# Patient Record
Sex: FEMALE | Race: BLACK OR AFRICAN AMERICAN | NOT HISPANIC OR LATINO
[De-identification: names, ages, dates, MRNs, and addresses within clinical notes are randomized per-mention and may not be internally consistent; named-entity substitution may affect disease eponyms.]

---

## 2017-09-21 ENCOUNTER — APPOINTMENT (OUTPATIENT)
Dept: INTERNAL MEDICINE | Facility: HOSPITAL | Age: 37
End: 2017-09-21

## 2018-01-02 ENCOUNTER — APPOINTMENT (OUTPATIENT)
Dept: INTERNAL MEDICINE | Facility: HOSPITAL | Age: 38
End: 2018-01-02

## 2018-02-05 ENCOUNTER — OTHER (OUTPATIENT)
Age: 38
End: 2018-02-05

## 2018-02-06 ENCOUNTER — APPOINTMENT (OUTPATIENT)
Dept: INTERNAL MEDICINE | Facility: HOSPITAL | Age: 38
End: 2018-02-06
Payer: MEDICAID

## 2018-02-06 ENCOUNTER — OUTPATIENT (OUTPATIENT)
Dept: OUTPATIENT SERVICES | Facility: HOSPITAL | Age: 38
LOS: 1 days | End: 2018-02-06

## 2018-02-06 ENCOUNTER — LABORATORY RESULT (OUTPATIENT)
Age: 38
End: 2018-02-06

## 2018-02-06 ENCOUNTER — MED ADMIN CHARGE (OUTPATIENT)
Age: 38
End: 2018-02-06

## 2018-02-06 ENCOUNTER — OTHER (OUTPATIENT)
Age: 38
End: 2018-02-06

## 2018-02-06 VITALS — DIASTOLIC BLOOD PRESSURE: 90 MMHG | SYSTOLIC BLOOD PRESSURE: 162 MMHG | HEART RATE: 84 BPM

## 2018-02-06 VITALS — WEIGHT: 220 LBS | HEIGHT: 63 IN | BODY MASS INDEX: 38.98 KG/M2

## 2018-02-06 LAB
BASOPHILS # BLD AUTO: 0.08 K/UL — SIGNIFICANT CHANGE UP (ref 0–0.2)
BASOPHILS NFR BLD AUTO: 1 % — SIGNIFICANT CHANGE UP (ref 0–2)
BUN SERPL-MCNC: 10 MG/DL — SIGNIFICANT CHANGE UP (ref 7–23)
CALCIUM SERPL-MCNC: 9.8 MG/DL — SIGNIFICANT CHANGE UP (ref 8.4–10.5)
CHLORIDE SERPL-SCNC: 99 MMOL/L — SIGNIFICANT CHANGE UP (ref 98–107)
CO2 SERPL-SCNC: 28 MMOL/L — SIGNIFICANT CHANGE UP (ref 22–31)
CREAT SERPL-MCNC: 0.62 MG/DL — SIGNIFICANT CHANGE UP (ref 0.5–1.3)
EOSINOPHIL # BLD AUTO: 0.36 K/UL — SIGNIFICANT CHANGE UP (ref 0–0.5)
EOSINOPHIL NFR BLD AUTO: 4.3 % — SIGNIFICANT CHANGE UP (ref 0–6)
GLUCOSE SERPL-MCNC: 95 MG/DL — SIGNIFICANT CHANGE UP (ref 70–99)
HBA1C BLD-MCNC: 5.8 % — HIGH (ref 4–5.6)
HCT VFR BLD CALC: 40 % — SIGNIFICANT CHANGE UP (ref 34.5–45)
HGB BLD-MCNC: 13.3 G/DL — SIGNIFICANT CHANGE UP (ref 11.5–15.5)
IMM GRANULOCYTES # BLD AUTO: 0.06 # — SIGNIFICANT CHANGE UP
IMM GRANULOCYTES NFR BLD AUTO: 0.7 % — SIGNIFICANT CHANGE UP (ref 0–1.5)
LYMPHOCYTES # BLD AUTO: 2.42 K/UL — SIGNIFICANT CHANGE UP (ref 1–3.3)
LYMPHOCYTES # BLD AUTO: 29 % — SIGNIFICANT CHANGE UP (ref 13–44)
MCHC RBC-ENTMCNC: 27 PG — SIGNIFICANT CHANGE UP (ref 27–34)
MCHC RBC-ENTMCNC: 33.3 % — SIGNIFICANT CHANGE UP (ref 32–36)
MCV RBC AUTO: 81.1 FL — SIGNIFICANT CHANGE UP (ref 80–100)
MONOCYTES # BLD AUTO: 0.64 K/UL — SIGNIFICANT CHANGE UP (ref 0–0.9)
MONOCYTES NFR BLD AUTO: 7.7 % — SIGNIFICANT CHANGE UP (ref 2–14)
NEUTROPHILS # BLD AUTO: 4.79 K/UL — SIGNIFICANT CHANGE UP (ref 1.8–7.4)
NEUTROPHILS NFR BLD AUTO: 57.3 % — SIGNIFICANT CHANGE UP (ref 43–77)
NRBC # FLD: 0 — SIGNIFICANT CHANGE UP
PLATELET # BLD AUTO: 306 K/UL — SIGNIFICANT CHANGE UP (ref 150–400)
PMV BLD: 10.6 FL — SIGNIFICANT CHANGE UP (ref 7–13)
POTASSIUM SERPL-MCNC: 4.2 MMOL/L — SIGNIFICANT CHANGE UP (ref 3.5–5.3)
POTASSIUM SERPL-SCNC: 4.2 MMOL/L — SIGNIFICANT CHANGE UP (ref 3.5–5.3)
RBC # BLD: 4.93 M/UL — SIGNIFICANT CHANGE UP (ref 3.8–5.2)
RBC # FLD: 13.8 % — SIGNIFICANT CHANGE UP (ref 10.3–14.5)
SODIUM SERPL-SCNC: 139 MMOL/L — SIGNIFICANT CHANGE UP (ref 135–145)
WBC # BLD: 8.35 K/UL — SIGNIFICANT CHANGE UP (ref 3.8–10.5)
WBC # FLD AUTO: 8.35 K/UL — SIGNIFICANT CHANGE UP (ref 3.8–10.5)

## 2018-02-06 PROCEDURE — 99214 OFFICE O/P EST MOD 30 MIN: CPT | Mod: GC

## 2018-02-07 DIAGNOSIS — Z00.00 ENCOUNTER FOR GENERAL ADULT MEDICAL EXAMINATION WITHOUT ABNORMAL FINDINGS: ICD-10-CM

## 2018-02-07 DIAGNOSIS — E28.2 POLYCYSTIC OVARIAN SYNDROME: ICD-10-CM

## 2018-02-07 DIAGNOSIS — N64.4 MASTODYNIA: ICD-10-CM

## 2018-02-07 DIAGNOSIS — R73.09 OTHER ABNORMAL GLUCOSE: ICD-10-CM

## 2018-02-07 DIAGNOSIS — I10 ESSENTIAL (PRIMARY) HYPERTENSION: ICD-10-CM

## 2018-03-12 ENCOUNTER — OUTPATIENT (OUTPATIENT)
Dept: OUTPATIENT SERVICES | Facility: HOSPITAL | Age: 38
LOS: 1 days | End: 2018-03-12

## 2018-03-12 ENCOUNTER — APPOINTMENT (OUTPATIENT)
Dept: INTERNAL MEDICINE | Facility: HOSPITAL | Age: 38
End: 2018-03-12
Payer: MEDICAID

## 2018-03-12 VITALS — SYSTOLIC BLOOD PRESSURE: 150 MMHG | DIASTOLIC BLOOD PRESSURE: 98 MMHG | HEART RATE: 87 BPM

## 2018-03-12 VITALS — BODY MASS INDEX: 38.98 KG/M2 | HEIGHT: 63 IN | WEIGHT: 220 LBS

## 2018-03-12 PROCEDURE — 99213 OFFICE O/P EST LOW 20 MIN: CPT | Mod: GE

## 2018-03-15 DIAGNOSIS — R73.09 OTHER ABNORMAL GLUCOSE: ICD-10-CM

## 2018-03-15 DIAGNOSIS — E28.2 POLYCYSTIC OVARIAN SYNDROME: ICD-10-CM

## 2018-03-15 DIAGNOSIS — N64.4 MASTODYNIA: ICD-10-CM

## 2018-03-15 DIAGNOSIS — I10 ESSENTIAL (PRIMARY) HYPERTENSION: ICD-10-CM

## 2018-03-28 ENCOUNTER — RESULT REVIEW (OUTPATIENT)
Age: 38
End: 2018-03-28

## 2018-03-28 ENCOUNTER — OUTPATIENT (OUTPATIENT)
Dept: OUTPATIENT SERVICES | Facility: HOSPITAL | Age: 38
LOS: 1 days | End: 2018-03-28

## 2018-03-28 ENCOUNTER — APPOINTMENT (OUTPATIENT)
Dept: OBGYN | Facility: HOSPITAL | Age: 38
End: 2018-03-28
Payer: MEDICAID

## 2018-03-28 VITALS — DIASTOLIC BLOOD PRESSURE: 108 MMHG | SYSTOLIC BLOOD PRESSURE: 158 MMHG | HEART RATE: 121 BPM

## 2018-03-28 VITALS — WEIGHT: 226 LBS | BODY MASS INDEX: 40.04 KG/M2 | HEIGHT: 63 IN

## 2018-03-28 DIAGNOSIS — Z87.09 PERSONAL HISTORY OF OTHER DISEASES OF THE RESPIRATORY SYSTEM: ICD-10-CM

## 2018-03-28 DIAGNOSIS — Z87.42 PERSONAL HISTORY OF OTHER DISEASES OF THE FEMALE GENITAL TRACT: ICD-10-CM

## 2018-03-28 DIAGNOSIS — Z86.79 PERSONAL HISTORY OF OTHER DISEASES OF THE CIRCULATORY SYSTEM: ICD-10-CM

## 2018-03-28 PROCEDURE — 99395 PREV VISIT EST AGE 18-39: CPT | Mod: GC

## 2018-03-28 RX ORDER — DIPHENHYDRAMINE HCL 25 MG/1
25 CAPSULE ORAL AS DIRECTED
Qty: 14 | Refills: 0 | Status: DISCONTINUED | COMMUNITY
Start: 2018-02-06 | End: 2018-03-28

## 2018-03-28 RX ORDER — FAMOTIDINE 20 MG/1
20 TABLET, FILM COATED ORAL AS DIRECTED
Qty: 14 | Refills: 0 | Status: DISCONTINUED | COMMUNITY
Start: 2018-02-06 | End: 2018-03-28

## 2018-03-29 DIAGNOSIS — Z01.419 ENCOUNTER FOR GYNECOLOGICAL EXAMINATION (GENERAL) (ROUTINE) WITHOUT ABNORMAL FINDINGS: ICD-10-CM

## 2018-03-29 PROBLEM — Z87.42 HISTORY OF POLYCYSTIC OVARIAN SYNDROME: Status: RESOLVED | Noted: 2018-03-28 | Resolved: 2018-03-29

## 2018-03-29 PROBLEM — Z87.09 HISTORY OF ASTHMA: Status: RESOLVED | Noted: 2018-03-28 | Resolved: 2018-03-29

## 2018-03-29 LAB — HPV HIGH+LOW RISK DNA PNL CVX: SIGNIFICANT CHANGE UP

## 2018-03-31 LAB — CYTOLOGY SPEC DOC CYTO: SIGNIFICANT CHANGE UP

## 2018-04-16 ENCOUNTER — OUTPATIENT (OUTPATIENT)
Dept: OUTPATIENT SERVICES | Facility: HOSPITAL | Age: 38
LOS: 1 days | End: 2018-04-16
Payer: MEDICAID

## 2018-04-16 ENCOUNTER — APPOINTMENT (OUTPATIENT)
Dept: MAMMOGRAPHY | Facility: IMAGING CENTER | Age: 38
End: 2018-04-16
Payer: MEDICAID

## 2018-04-16 ENCOUNTER — APPOINTMENT (OUTPATIENT)
Dept: ULTRASOUND IMAGING | Facility: IMAGING CENTER | Age: 38
End: 2018-04-16
Payer: MEDICAID

## 2018-04-16 DIAGNOSIS — N64.4 MASTODYNIA: ICD-10-CM

## 2018-04-16 PROCEDURE — 77067 SCR MAMMO BI INCL CAD: CPT | Mod: 26

## 2018-04-16 PROCEDURE — 77063 BREAST TOMOSYNTHESIS BI: CPT

## 2018-04-16 PROCEDURE — 76641 ULTRASOUND BREAST COMPLETE: CPT | Mod: 26,50

## 2018-04-16 PROCEDURE — 77063 BREAST TOMOSYNTHESIS BI: CPT | Mod: 26

## 2018-04-16 PROCEDURE — 76641 ULTRASOUND BREAST COMPLETE: CPT

## 2018-04-16 PROCEDURE — 77067 SCR MAMMO BI INCL CAD: CPT

## 2018-04-18 ENCOUNTER — APPOINTMENT (OUTPATIENT)
Dept: INTERNAL MEDICINE | Facility: HOSPITAL | Age: 38
End: 2018-04-18
Payer: MEDICAID

## 2018-04-18 ENCOUNTER — OUTPATIENT (OUTPATIENT)
Dept: OUTPATIENT SERVICES | Facility: HOSPITAL | Age: 38
LOS: 1 days | End: 2018-04-18

## 2018-04-18 VITALS — HEART RATE: 94 BPM | DIASTOLIC BLOOD PRESSURE: 90 MMHG | SYSTOLIC BLOOD PRESSURE: 152 MMHG

## 2018-04-18 VITALS — BODY MASS INDEX: 38.62 KG/M2 | HEIGHT: 63 IN | WEIGHT: 218 LBS

## 2018-04-18 PROCEDURE — 99213 OFFICE O/P EST LOW 20 MIN: CPT | Mod: GE

## 2018-04-19 DIAGNOSIS — E28.2 POLYCYSTIC OVARIAN SYNDROME: ICD-10-CM

## 2018-04-19 DIAGNOSIS — I10 ESSENTIAL (PRIMARY) HYPERTENSION: ICD-10-CM

## 2018-04-19 DIAGNOSIS — N64.4 MASTODYNIA: ICD-10-CM

## 2018-04-25 ENCOUNTER — APPOINTMENT (OUTPATIENT)
Dept: ULTRASOUND IMAGING | Facility: IMAGING CENTER | Age: 38
End: 2018-04-25
Payer: MEDICAID

## 2018-04-25 ENCOUNTER — OUTPATIENT (OUTPATIENT)
Dept: OUTPATIENT SERVICES | Facility: HOSPITAL | Age: 38
LOS: 1 days | End: 2018-04-25
Payer: MEDICAID

## 2018-04-25 ENCOUNTER — APPOINTMENT (OUTPATIENT)
Dept: MAMMOGRAPHY | Facility: IMAGING CENTER | Age: 38
End: 2018-04-25
Payer: MEDICAID

## 2018-04-25 DIAGNOSIS — R92.2 INCONCLUSIVE MAMMOGRAM: ICD-10-CM

## 2018-04-25 PROCEDURE — G0279: CPT | Mod: 26

## 2018-04-25 PROCEDURE — 76642 ULTRASOUND BREAST LIMITED: CPT

## 2018-04-25 PROCEDURE — 77067 SCR MAMMO BI INCL CAD: CPT

## 2018-04-25 PROCEDURE — 77063 BREAST TOMOSYNTHESIS BI: CPT

## 2018-04-25 PROCEDURE — 77065 DX MAMMO INCL CAD UNI: CPT | Mod: 26,LT

## 2018-04-25 PROCEDURE — G0279: CPT

## 2018-04-25 PROCEDURE — 76642 ULTRASOUND BREAST LIMITED: CPT | Mod: 26,LT

## 2018-04-25 PROCEDURE — 77065 DX MAMMO INCL CAD UNI: CPT

## 2018-04-30 ENCOUNTER — OTHER (OUTPATIENT)
Age: 38
End: 2018-04-30

## 2018-04-30 ENCOUNTER — FORM ENCOUNTER (OUTPATIENT)
Age: 38
End: 2018-04-30

## 2018-05-01 ENCOUNTER — APPOINTMENT (OUTPATIENT)
Dept: ULTRASOUND IMAGING | Facility: IMAGING CENTER | Age: 38
End: 2018-05-01
Payer: MEDICAID

## 2018-05-01 ENCOUNTER — OUTPATIENT (OUTPATIENT)
Dept: OUTPATIENT SERVICES | Facility: HOSPITAL | Age: 38
LOS: 1 days | End: 2018-05-01
Payer: MEDICAID

## 2018-05-01 ENCOUNTER — RESULT REVIEW (OUTPATIENT)
Age: 38
End: 2018-05-01

## 2018-05-01 DIAGNOSIS — N64.4 MASTODYNIA: ICD-10-CM

## 2018-05-01 PROCEDURE — 77065 DX MAMMO INCL CAD UNI: CPT | Mod: 26,LT

## 2018-05-01 PROCEDURE — 19083 BX BREAST 1ST LESION US IMAG: CPT | Mod: LT

## 2018-05-01 PROCEDURE — 19083 BX BREAST 1ST LESION US IMAG: CPT

## 2018-05-01 PROCEDURE — A4648: CPT

## 2018-05-01 PROCEDURE — 88305 TISSUE EXAM BY PATHOLOGIST: CPT | Mod: 26

## 2018-05-01 PROCEDURE — 88305 TISSUE EXAM BY PATHOLOGIST: CPT

## 2018-05-01 PROCEDURE — 77065 DX MAMMO INCL CAD UNI: CPT

## 2018-05-02 LAB — SURGICAL PATHOLOGY STUDY: SIGNIFICANT CHANGE UP

## 2018-05-25 ENCOUNTER — LABORATORY RESULT (OUTPATIENT)
Age: 38
End: 2018-05-25

## 2018-05-25 ENCOUNTER — OUTPATIENT (OUTPATIENT)
Dept: OUTPATIENT SERVICES | Facility: HOSPITAL | Age: 38
LOS: 1 days | End: 2018-05-25

## 2018-05-25 ENCOUNTER — APPOINTMENT (OUTPATIENT)
Dept: INTERNAL MEDICINE | Facility: HOSPITAL | Age: 38
End: 2018-05-25
Payer: MEDICAID

## 2018-05-25 VITALS — HEIGHT: 63 IN | WEIGHT: 222 LBS | BODY MASS INDEX: 39.34 KG/M2

## 2018-05-25 VITALS — DIASTOLIC BLOOD PRESSURE: 85 MMHG | HEART RATE: 82 BPM | SYSTOLIC BLOOD PRESSURE: 140 MMHG

## 2018-05-25 LAB
CHOLEST SERPL-MCNC: 244 MG/DL — HIGH (ref 120–199)
HBA1C BLD-MCNC: 6.1 % — HIGH (ref 4–5.6)
HDLC SERPL-MCNC: 44 MG/DL — LOW (ref 45–65)
LIPID PNL WITH DIRECT LDL SERPL: 185 MG/DL — SIGNIFICANT CHANGE UP
TRIGL SERPL-MCNC: 184 MG/DL — HIGH (ref 10–149)

## 2018-05-25 PROCEDURE — 99214 OFFICE O/P EST MOD 30 MIN: CPT | Mod: GC

## 2018-05-25 RX ORDER — MEDROXYPROGESTERONE ACETATE 10 MG/1
10 TABLET ORAL DAILY
Qty: 10 | Refills: 0 | Status: DISCONTINUED | COMMUNITY
Start: 2018-03-28 | End: 2018-05-25

## 2018-05-25 RX ORDER — LOSARTAN POTASSIUM 50 MG/1
50 TABLET, FILM COATED ORAL DAILY
Qty: 90 | Refills: 1 | Status: DISCONTINUED | COMMUNITY
Start: 2018-04-18 | End: 2018-05-25

## 2018-05-25 NOTE — REVIEW OF SYSTEMS
[Wheezing] : wheezing [Fever] : no fever [Chills] : no chills [Fatigue] : no fatigue [Vision Problems] : no vision problems [Chest Pain] : no chest pain [Palpitations] : no palpitations [Lower Ext Edema] : no lower extremity edema [Shortness Of Breath] : no shortness of breath [Cough] : no cough [Dyspnea on Exertion] : no dyspnea on exertion [Abdominal Pain] : no abdominal pain [Nausea] : no nausea [Constipation] : no constipation [Diarrhea] : diarrhea [Vomiting] : no vomiting [Joint Pain] : no joint pain [FreeTextEntry6] : wheezing only present during exercise, and relents s/p albuterol

## 2018-05-25 NOTE — PHYSICAL EXAM
[No Acute Distress] : no acute distress [Well Nourished] : well nourished [Normal Sclera/Conjunctiva] : normal sclera/conjunctiva [PERRL] : pupils equal round and reactive to light [EOMI] : extraocular movements intact [No JVD] : no jugular venous distention [Supple] : supple [No Lymphadenopathy] : no lymphadenopathy [No Respiratory Distress] : no respiratory distress  [Clear to Auscultation] : lungs were clear to auscultation bilaterally [No Accessory Muscle Use] : no accessory muscle use [Normal Rate] : normal rate  [Regular Rhythm] : with a regular rhythm [Normal S1, S2] : normal S1 and S2 [No Murmur] : no murmur heard [Pedal Pulses Present] : the pedal pulses are present [No Edema] : there was no peripheral edema [Soft] : abdomen soft [Non Tender] : non-tender [Non-distended] : non-distended [Normal Posterior Cervical Nodes] : no posterior cervical lymphadenopathy [Normal Anterior Cervical Nodes] : no anterior cervical lymphadenopathy [No CVA Tenderness] : no CVA  tenderness [No Spinal Tenderness] : no spinal tenderness [Normal Gait] : normal gait [Coordination Grossly Intact] : coordination grossly intact [No Focal Deficits] : no focal deficits [Normal Affect] : the affect was normal [Normal Insight/Judgement] : insight and judgment were intact

## 2018-05-25 NOTE — HISTORY OF PRESENT ILLNESS
[de-identified] : This is a 36 y/o F with a PMH significant for HTN, PCOS, and asthma who presents for follow up. \par \par HTN: Patient had persistently elevated pressures at last visits, currently taking metoprolol 50 mg BID, chlorthalidone 25 mg daily, and had added on losartan 50 mg daily at last visit. She has not missed any doses. She denies any symptoms of headaches, vision changes, or palpitations. She does state that she has started doing zach BID multiple days out of the week, and is losing weight.  \par \par PCOS: Patient w/ h/o irregular menstrual periods. Patient has had normal menses in April and May, lasting approx 6 days. She thinks this is related to her losing weight and starting zach.  \par \par L breast pain: Patient had a hypoechoic retroareolar nodule on U/S, so she underwent core needle biopsy, which showed nodular sclerosing adenosis. She has not had any post-procedure pain or issues.

## 2018-05-29 DIAGNOSIS — N64.4 MASTODYNIA: ICD-10-CM

## 2018-05-29 DIAGNOSIS — I10 ESSENTIAL (PRIMARY) HYPERTENSION: ICD-10-CM

## 2018-05-29 DIAGNOSIS — R73.09 OTHER ABNORMAL GLUCOSE: ICD-10-CM

## 2018-05-29 DIAGNOSIS — E28.2 POLYCYSTIC OVARIAN SYNDROME: ICD-10-CM

## 2018-06-22 ENCOUNTER — APPOINTMENT (OUTPATIENT)
Dept: INTERNAL MEDICINE | Facility: HOSPITAL | Age: 38
End: 2018-06-22

## 2019-08-16 ENCOUNTER — EMERGENCY (EMERGENCY)
Facility: HOSPITAL | Age: 39
LOS: 1 days | Discharge: HOME | End: 2019-08-16
Attending: EMERGENCY MEDICINE | Admitting: EMERGENCY MEDICINE
Payer: MEDICAID

## 2019-08-16 VITALS
HEART RATE: 127 BPM | DIASTOLIC BLOOD PRESSURE: 99 MMHG | RESPIRATION RATE: 18 BRPM | SYSTOLIC BLOOD PRESSURE: 140 MMHG | OXYGEN SATURATION: 100 % | TEMPERATURE: 100 F

## 2019-08-16 DIAGNOSIS — N93.9 ABNORMAL UTERINE AND VAGINAL BLEEDING, UNSPECIFIED: ICD-10-CM

## 2019-08-16 LAB
ALBUMIN SERPL ELPH-MCNC: 4.8 G/DL — SIGNIFICANT CHANGE UP (ref 3.3–5)
ALP SERPL-CCNC: 73 U/L — SIGNIFICANT CHANGE UP (ref 40–120)
ALT FLD-CCNC: 44 U/L — HIGH (ref 4–33)
ANION GAP SERPL CALC-SCNC: 15 MMO/L — HIGH (ref 7–14)
APPEARANCE UR: SIGNIFICANT CHANGE UP
APTT BLD: 34.1 SEC — SIGNIFICANT CHANGE UP (ref 27.5–36.3)
AST SERPL-CCNC: 34 U/L — HIGH (ref 4–32)
BACTERIA # UR AUTO: SIGNIFICANT CHANGE UP
BASOPHILS # BLD AUTO: 0.06 K/UL — SIGNIFICANT CHANGE UP (ref 0–0.2)
BASOPHILS NFR BLD AUTO: 0.6 % — SIGNIFICANT CHANGE UP (ref 0–2)
BILIRUB SERPL-MCNC: 0.4 MG/DL — SIGNIFICANT CHANGE UP (ref 0.2–1.2)
BILIRUB UR-MCNC: NEGATIVE — SIGNIFICANT CHANGE UP
BLD GP AB SCN SERPL QL: NEGATIVE — SIGNIFICANT CHANGE UP
BLOOD UR QL VISUAL: HIGH
BUN SERPL-MCNC: 11 MG/DL — SIGNIFICANT CHANGE UP (ref 7–23)
CALCIUM SERPL-MCNC: 9.4 MG/DL — SIGNIFICANT CHANGE UP (ref 8.4–10.5)
CHLORIDE SERPL-SCNC: 103 MMOL/L — SIGNIFICANT CHANGE UP (ref 98–107)
CO2 SERPL-SCNC: 22 MMOL/L — SIGNIFICANT CHANGE UP (ref 22–31)
COLOR SPEC: SIGNIFICANT CHANGE UP
CREAT SERPL-MCNC: 0.58 MG/DL — SIGNIFICANT CHANGE UP (ref 0.5–1.3)
EOSINOPHIL # BLD AUTO: 0.23 K/UL — SIGNIFICANT CHANGE UP (ref 0–0.5)
EOSINOPHIL NFR BLD AUTO: 2.4 % — SIGNIFICANT CHANGE UP (ref 0–6)
GLUCOSE SERPL-MCNC: 142 MG/DL — HIGH (ref 70–99)
GLUCOSE UR-MCNC: NEGATIVE — SIGNIFICANT CHANGE UP
HCG SERPL-ACNC: < 5 MIU/ML — SIGNIFICANT CHANGE UP
HCT VFR BLD CALC: 22.7 % — LOW (ref 34.5–45)
HGB BLD-MCNC: 6.4 G/DL — CRITICAL LOW (ref 11.5–15.5)
HYALINE CASTS # UR AUTO: SIGNIFICANT CHANGE UP
IMM GRANULOCYTES NFR BLD AUTO: 1.2 % — SIGNIFICANT CHANGE UP (ref 0–1.5)
INR BLD: 1.17 — SIGNIFICANT CHANGE UP (ref 0.88–1.17)
KETONES UR-MCNC: SIGNIFICANT CHANGE UP
LEUKOCYTE ESTERASE UR-ACNC: NEGATIVE — SIGNIFICANT CHANGE UP
LYMPHOCYTES # BLD AUTO: 2.91 K/UL — SIGNIFICANT CHANGE UP (ref 1–3.3)
LYMPHOCYTES # BLD AUTO: 30.6 % — SIGNIFICANT CHANGE UP (ref 13–44)
MCHC RBC-ENTMCNC: 21 PG — LOW (ref 27–34)
MCHC RBC-ENTMCNC: 28.2 % — LOW (ref 32–36)
MCV RBC AUTO: 74.4 FL — LOW (ref 80–100)
MONOCYTES # BLD AUTO: 0.73 K/UL — SIGNIFICANT CHANGE UP (ref 0–0.9)
MONOCYTES NFR BLD AUTO: 7.7 % — SIGNIFICANT CHANGE UP (ref 2–14)
NEUTROPHILS # BLD AUTO: 5.46 K/UL — SIGNIFICANT CHANGE UP (ref 1.8–7.4)
NEUTROPHILS NFR BLD AUTO: 57.5 % — SIGNIFICANT CHANGE UP (ref 43–77)
NITRITE UR-MCNC: NEGATIVE — SIGNIFICANT CHANGE UP
NRBC # FLD: 0.04 K/UL — SIGNIFICANT CHANGE UP (ref 0–0)
PH UR: 6 — SIGNIFICANT CHANGE UP (ref 5–8)
PLATELET # BLD AUTO: 446 K/UL — HIGH (ref 150–400)
PMV BLD: 10 FL — SIGNIFICANT CHANGE UP (ref 7–13)
POTASSIUM SERPL-MCNC: 4 MMOL/L — SIGNIFICANT CHANGE UP (ref 3.5–5.3)
POTASSIUM SERPL-SCNC: 4 MMOL/L — SIGNIFICANT CHANGE UP (ref 3.5–5.3)
PROT SERPL-MCNC: 8 G/DL — SIGNIFICANT CHANGE UP (ref 6–8.3)
PROT UR-MCNC: 200 — HIGH
PROTHROM AB SERPL-ACNC: 13.4 SEC — HIGH (ref 9.8–13.1)
RBC # BLD: 3.05 M/UL — LOW (ref 3.8–5.2)
RBC # FLD: 14.4 % — SIGNIFICANT CHANGE UP (ref 10.3–14.5)
RBC CASTS # UR COMP ASSIST: >50 — HIGH (ref 0–?)
RH IG SCN BLD-IMP: POSITIVE — SIGNIFICANT CHANGE UP
RH IG SCN BLD-IMP: POSITIVE — SIGNIFICANT CHANGE UP
SODIUM SERPL-SCNC: 140 MMOL/L — SIGNIFICANT CHANGE UP (ref 135–145)
SP GR SPEC: 1.03 — SIGNIFICANT CHANGE UP (ref 1–1.04)
SQUAMOUS # UR AUTO: SIGNIFICANT CHANGE UP
UROBILINOGEN FLD QL: NORMAL — SIGNIFICANT CHANGE UP
WBC # BLD: 9.5 K/UL — SIGNIFICANT CHANGE UP (ref 3.8–10.5)
WBC # FLD AUTO: 9.5 K/UL — SIGNIFICANT CHANGE UP (ref 3.8–10.5)
WBC UR QL: HIGH (ref 0–?)

## 2019-08-16 PROCEDURE — 71045 X-RAY EXAM CHEST 1 VIEW: CPT | Mod: 26,77

## 2019-08-16 PROCEDURE — 76830 TRANSVAGINAL US NON-OB: CPT | Mod: 26

## 2019-08-16 PROCEDURE — 71045 X-RAY EXAM CHEST 1 VIEW: CPT | Mod: 26

## 2019-08-16 PROCEDURE — 99220: CPT

## 2019-08-16 PROCEDURE — 99283 EMERGENCY DEPT VISIT LOW MDM: CPT

## 2019-08-16 RX ORDER — HYDROCHLOROTHIAZIDE 25 MG
0 TABLET ORAL
Qty: 0 | Refills: 0 | DISCHARGE

## 2019-08-16 RX ORDER — SODIUM CHLORIDE 9 MG/ML
1000 INJECTION INTRAMUSCULAR; INTRAVENOUS; SUBCUTANEOUS ONCE
Refills: 0 | Status: COMPLETED | OUTPATIENT
Start: 2019-08-16 | End: 2019-08-16

## 2019-08-16 RX ORDER — AMLODIPINE BESYLATE 2.5 MG/1
0 TABLET ORAL
Qty: 0 | Refills: 0 | DISCHARGE

## 2019-08-16 RX ORDER — MEDROXYPROGESTERONE ACETATE 150 MG/ML
5 INJECTION, SUSPENSION, EXTENDED RELEASE INTRAMUSCULAR ONCE
Refills: 0 | Status: COMPLETED | OUTPATIENT
Start: 2019-08-16 | End: 2019-08-16

## 2019-08-16 RX ORDER — DIPHENHYDRAMINE HCL 50 MG
50 CAPSULE ORAL ONCE
Refills: 0 | Status: COMPLETED | OUTPATIENT
Start: 2019-08-16 | End: 2019-08-16

## 2019-08-16 RX ORDER — ACETAMINOPHEN 500 MG
650 TABLET ORAL ONCE
Refills: 0 | Status: COMPLETED | OUTPATIENT
Start: 2019-08-16 | End: 2019-08-16

## 2019-08-16 RX ADMIN — MEDROXYPROGESTERONE ACETATE 5 MILLIGRAM(S): 150 INJECTION, SUSPENSION, EXTENDED RELEASE INTRAMUSCULAR at 10:13

## 2019-08-16 RX ADMIN — SODIUM CHLORIDE 1000 MILLILITER(S): 9 INJECTION INTRAMUSCULAR; INTRAVENOUS; SUBCUTANEOUS at 02:45

## 2019-08-16 RX ADMIN — Medication 650 MILLIGRAM(S): at 09:06

## 2019-08-16 NOTE — ED ADULT NURSE REASSESSMENT NOTE - NS ED NURSE REASSESS COMMENT FT1
attempted to medicate pt with benadryl due to itchy symptoms around IV site. pt refused. pt stated itchiness resolved once blood transfusion was stopped.

## 2019-08-16 NOTE — ED PROVIDER NOTE - PROGRESS NOTE DETAILS
labs, fluids. will transfuse as needed Resident Haylie Uriarte: Discussed case with CDU Resident Haylie Uriarte: pt is examined by obgyn (resident Mercado) - recommendations: d/c home with Provera 5mg QD x30 days, and inform patient about withdrawal bleeding if patient stops taking or miss a dose. F/u in clinic in 2 weeks. Tamir Hdez PGY3: patient received unit of prbc prior to being transferred to CDU

## 2019-08-16 NOTE — CONSULT NOTE ADULT - PROBLEM SELECTOR RECOMMENDATION 9
- no need for impatient GYN treatment at this time  - - no need for impatient GYN treatment at this time  - continue blood transfusion per ED  - recommend Provera 5mg daily for 30 days to decreased bleeding. Pt counseled that she will experience withdrawal bleeding once she stops taking the Provera  - Pt to f/u in the [facility] clinic within the next 2 weeks for outpatient management and discussion of Mirena IUD placement. [phone]    D/w Dr. Maile grigsby pgy-2

## 2019-08-16 NOTE — CONSULT NOTE ADULT - ASSESSMENT
Pt is a 38 yo  with hx of heavy bleeding and irregular periods requiring transfusion 7 yrs ago, LMP 19 who presents to the ED for consistent vaginal bleeding since the start of her period. Pt is persistently tachycardic , H/H is 6.4/22.7. TVUS shows endometrial 1.1cm, heterogenous in nature. Pt is receiving 2 units PRBCs in the ED. Given her history of heavy bleeding, irregular cycles that were controlled on OCPs/Nuvaring, and skin findings, likely PCOS.     ED Spectra: [id number] Pt is a 40 yo G0 with hx of heavy bleeding and irregular periods requiring transfusion 7 yrs ago, LMP 7/22/19 who presents to the ED for consistent vaginal bleeding since the start of her period and symptomatic anemia. Bleeding is mild on speculum exam. Pt is persistently tachycardic , H/H is 6.4/22.7, TVUS shows endometrial 1.1cm, heterogenous in nature. Pt is receiving 2 uPRBCs in the ED. Given her history of heavy bleeding, irregular cycles that were previously controlled on OCPs/Nuvaring, and acanthosis nigricans bleeding is 2/2 likely PCOS.

## 2019-08-16 NOTE — ED PROVIDER NOTE - NSFOLLOWUPINSTRUCTIONS_ED_ALL_ED_FT
You are prescribed Provera 5mg once a day for 30 days - if you stop taking the medication or miss a dose you may start experiencing withdrawal vaginal bleeding. Follow up with OBGYN in 2 weeks in clinic as directed. You are prescribed Provera 5mg once a day for 30 days - if you stop taking the medication or miss a dose you may start experiencing withdrawal vaginal bleeding.   Follow up with [facility] in 2 weeks in clinic as directed for outpatient management and discussion of Mirena IUD placement. PH: [phone]

## 2019-08-16 NOTE — ED ADULT TRIAGE NOTE - CHIEF COMPLAINT QUOTE
c/o heavy vaginal bleeding x 3 weeks. states is now feeling lightheaded, sob, and weak. appears pale. states has had similar episodes in the past needed transfusion. hx HTN, asthma

## 2019-08-16 NOTE — ED PROVIDER NOTE - NS ED ROS FT
ROS: denies HA, fevers/chills, nausea/vomiting, chest pain, diaphoresis, abdominal pain, diarrhea, joint pain, neuro deficits, dysuria/hematuria, rash    +weakness, sob

## 2019-08-16 NOTE — ED CDU PROVIDER INITIAL DAY NOTE - MEDICAL DECISION MAKING DETAILS
Patient is a  39 year old female with 3 weeks vaginal bleeding. Plan for transfusion 2 units PRBC and follow up CBC

## 2019-08-16 NOTE — ED CDU PROVIDER INITIAL DAY NOTE - ATTENDING CONTRIBUTION TO CARE
Lio: I have seen and examined the patient face to face, have reviewed and addended the HPI, PE and a/p as necessary.    Patient  is a 39 year old female who states she had gotten her menses on 7/22/19. States since that time she has been bleeding. States  she  uses 5-6 pads daily often with heavy clotting. States that she had  been feeling lightheaded. She denies shortness of breath. States she has not taken birth control for the past 2 years.  Patient was noted to have low grade fever and tachycardia in the ED.  Pt reports slight shortness of breath after ambulating to the bathroom in the CDU.  Denies chest pain.     GEN - NAD; well appearing; A+O x3; non-toxic appearing  CARD -s1s2, tachycardia, no M,G,R;   PULM - CTA b/l, symmetric breath sounds;   ABD -  +BS, ND, NT, soft, no guarding, no rebound, no masses;   BACK - no CVA tenderness, Normal  spine;   EXT - symmetric pulses, 2+ dp, capillary refill < 2 seconds, no clubbing, no cyanosis, no edema  NEURO - no focal neuro deficits, no slurred speech                       6.4    9.50  )-----------( 446      ( 16 Aug 2019 02:08 )             22.7     140  |  103  |  11  ----------------------------<  142<H>  4.0   |  22  |  0.58  Ca    9.4      16 Aug 2019 02:08  TPro  8.0  /  Alb  4.8  /  TBili  0.4  /  DBili  x   /  AST  34<H>  /  ALT  44<H>  /  AlkPhos  73  08-16      38 yo F a/w heavy menstrual bleeding with anemia to Hgb 6.4 found to have low grade fever upon initiating 1st blood transfusion, now receiving 1st complete unit in CDU.  Will obtain chest xray, and ua to eval for fever.  Will give 2nd unit and reassess.

## 2019-08-16 NOTE — ED PROVIDER NOTE - ATTENDING CONTRIBUTION TO CARE
38yo F with PMHx DUB (unsure if she has fibroids, has not seen OBGYN for years), prior transfusion for vaginal bleeding, p/w 3 weeks of vaginal bleeding with clots, using 5pads/day and she doubles each pad up, now feeling for last week more weak, decreased exercise tolerance, fatigued. No chest pain or syncope.    General: Patient in no apparent distress, AAO x 3  Skin: Dry and intact  HEENT: Oral mucosa moist. No pharyngeal exudates or tonsillar enlargement  Eyes: Conjunctiva pale  Cardiac: Regular rate and rhythm  Respiratory: Lungs clear b/l and symmetric. No respiratory distress  Gastrointestinal: Abdomen soft, nondistended, nontender  Musculoskeletal: Moves all extremities spontaneously  Neurological: alert and oriented to personal, place and time  Psychiatric: Cooperative    a/p  DUB  concern for anemia requiring transfusion  labs with type and sceen

## 2019-08-16 NOTE — ED ADULT NURSE REASSESSMENT NOTE - NS ED NURSE REASSESS COMMENT FT1
blood transfusion initiated and transfusing at this time. patient educated on possible adverse reactions. pt appears stable and no complaints at this time. will continue to monitor.

## 2019-08-16 NOTE — ED ADULT NURSE REASSESSMENT NOTE - NS ED NURSE REASSESS COMMENT FT1
10 minutes after infusion pt c/o localized itching and pain at IV site on right hand. blood transfusion stopped at this time. MD valdivia made aware and awaiting US IV insertion.

## 2019-08-16 NOTE — ED PROVIDER NOTE - PHYSICAL EXAMINATION
Gen: Well appearing, NAD  Head: NCAT  HEENT: PERRL, MMM, pale conjunctiva, anicteric, neck supple  Lung: CTAB, no adventitious sounds  CV: tachycardic, no murmurs, rubs or gallops  Abd: soft, NTND, no rebound or guarding, no CVAT  MSK: No edema, no visible deformities  Neuro: No focal neurologic deficits. CN II-XII grossly intact. 5/5 strength and normal sensation in all extremities.  Skin: Warm and dry, no evidence of rash  Psych: normal mood and affec

## 2019-08-16 NOTE — ED PROVIDER NOTE - CLINICAL SUMMARY MEDICAL DECISION MAKING FREE TEXT BOX
vaginal bleeding for 3 weeks - now presents with tachycardia, exertional lightheadedness and sob. Will check Hgb, transfuse as needed. will check UA and Upreg to r/o pregnancy.

## 2019-08-16 NOTE — CONSULT NOTE ADULT - SUBJECTIVE AND OBJECTIVE BOX
ORMONDEANN HACKSHAW  39y  Female [id number]    HPI: Pt is a 40 yo  with hx of heavy bleeding and irregular periods LMP  who presents to the ED for consistent bleeding since the start of her period. She has consistently soaked through 4-5 pads a day with clots. She has been on OCP         Name of GYN Physician:     POB:      Pgyn: Denies fibroids, cysts, endometriosis, STI's, Abnormal pap smears     Home meds:     Hospital Meds:   MEDICATIONS  (STANDING):    MEDICATIONS  (PRN):      Allergies    Corn (Stomach Upset; Rash; Short breath)  No Known Drug Allergies    Intolerances        PAST MEDICAL & SURGICAL HISTORY:  HTN (hypertension)      FAMILY HISTORY:      Social History:  Denies smoking use, drug use, alcohol use.   +occasional social alcohol use    Vital Signs Last 24 Hrs  T(C): 37.6 (16 Aug 2019 06:08), Max: 37.6 (16 Aug 2019 00:08)  T(F): 99.7 (16 Aug 2019 06:08), Max: 99.7 (16 Aug 2019 00:08)  HR: 110 (16 Aug 2019 06:08) (110 - 127)  BP: 161/71 (16 Aug 2019 06:08) (140/99 - 161/71)  BP(mean): --  RR: 20 (16 Aug 2019 06:08) (18 - 20)  SpO2: 100% (16 Aug 2019 06:08) (100% - 100%)    Physical Exam:   General: sitting comftorably in bed, NAD   HEENT: neck supple, full ROM  CV: RR S1S2 no m/r/g  Lungs: CTA b/l, good air flow b/l   Back: No CVA tenderness  Abd: Soft, non-tender, non-distended.  Bowel sounds present.    :  No bleeding on pad.    External labia wnl.  Bimanual exam with no cervical motion tenderness, uterus wnl, adnexa non palpable b/l.  Cervix closed vs. Cervix dilated    cm   Speculum Exam: No active bleeding from os.  Physiologic discharge.    Ext: non-tender b/l, no edema     LABS:                              6.4    9.50  )-----------( 446      ( 16 Aug 2019 02:08 )             22.7     08-16    140  |  103  |  11  ----------------------------<  142<H>  4.0   |  22  |  0.58    Ca    9.4      16 Aug 2019 02:08    TPro  8.0  /  Alb  4.8  /  TBili  0.4  /  DBili  x   /  AST  34<H>  /  ALT  44<H>  /  AlkPhos  73  08-16    I&O's Detail    PT/INR - ( 16 Aug 2019 02:08 )   PT: 13.4 SEC;   INR: 1.17          PTT - ( 16 Aug 2019 02:08 )  PTT:34.1 SEC      RADIOLOGY & ADDITIONAL STUDIES: ORMONDEANN HACKSHAW  39y  Female [id number]    HPI: Pt is a 40 yo  with hx of heavy bleeding and irregular periods requiring transfusion 7 yrs ago, LMP 19 who presents to the ED for consistent bleeding since the start of her period. She has consistently soaked through 4-5 pads a day with clots with cramping back and abdominal pain similar to her period cramps. She has been on OCP at adolescence and helped regulate her periods and decrease bleeding until her late 20s, at which she discontinued OCP. Bleeding and irregularity have also been controlled on Nuvaring but was discontinued 2 years ago. She has had regular periods for the last year. Her last cycle started 19.     On ROS, she has lightheadedness, especially while standing, nausea, some SOB and chest tightness on arrival to the ED. Denies vomiting, dysuria, constipation/diarrhea, palpitations.       Name of GYN Physician: [facility]    POB:      Pgyn: Denies fibroids, cysts, endometriosis, STI's, Abnormal pap smears     Home meds: HCTZ, another BP med  - inhaler PRN (last used a year ago)    Hospital Meds:   MEDICATIONS  (STANDING):    MEDICATIONS  (PRN):      Allergies    Corn (Stomach Upset; Rash; Short breath)  No Known Drug Allergies    Intolerances        PAST MEDICAL & SURGICAL HISTORY:  HTN (hypertension)      FAMILY HISTORY: HTN      Social History:  Denies smoking use, drug use, alcohol use.   +occasional social alcohol use    Vital Signs Last 24 Hrs  T(C): 37.6 (16 Aug 2019 06:08), Max: 37.6 (16 Aug 2019 00:08)  T(F): 99.7 (16 Aug 2019 06:08), Max: 99.7 (16 Aug 2019 00:08)  HR: 110 (16 Aug 2019 06:08) (110 - 127)  BP: 161/71 (16 Aug 2019 06:08) (140/99 - 161/71)  BP(mean): --  RR: 20 (16 Aug 2019 06:08) (18 - 20)  SpO2: 100% (16 Aug 2019 06:08) (100% - 100%)    Physical Exam:   General: sitting comfortably in bed, NAD   HEENT: neck supple, full ROM  CV: RR S1S2 no m/r/g  Lungs: CTA b/l, good air flow b/l   Back: No CVA tenderness  Abd: Soft, non-tender, non-distended.  Bowel sounds present.    :  Bleeding on pad.    External labia wnl.  Bimanual exam with no cervical motion tenderness, uterus wnl, adnexa non palpable b/l.  Cervix closed.   Speculum Exam: Active bleeding from os.   Ext: non-tender b/l, no edema   Skin: velvety hyperpigmented skin in creases of neck and groin    LABS:                              6.4    9.50  )-----------( 446      ( 16 Aug 2019 02:08 )             22.7     08-    140  |  103  |  11  ----------------------------<  142<H>  4.0   |  22  |  0.58    Ca    9.4      16 Aug 2019 02:08    TPro  8.0  /  Alb  4.8  /  TBili  0.4  /  DBili  x   /  AST  34<H>  /  ALT  44<H>  /  AlkPhos  73  08-16    I&O's Detail    PT/INR - ( 16 Aug 2019 02:08 )   PT: 13.4 SEC;   INR: 1.17          PTT - ( 16 Aug 2019 02:08 )  PTT:34.1 SEC      RADIOLOGY & ADDITIONAL STUDIES: ORMONDEANN HACKSHAW  39y  Female [id number]    HPI: Pt is a 38 yo  with hx of heavy bleeding and irregular periods requiring transfusion 7 yrs ago, LMP 19 who presents to the ED for consistent bleeding since the start of her period. She has consistently soaked through 4-5 pads a day with clots, cramping, back and abdominal pain similar to her period cramps. She has been on OCP at adolescence and helped regulate her periods and decrease bleeding until her late 20s, at which she discontinued OCP. Bleeding and irregularity have also been controlled on Nuvaring but was discontinued 2 years ago. She has had regular periods for the last year. Her prior cycle started 19.     On ROS, she has lightheadedness, especially while standing, nausea, some SOB and chest tightness on arrival to the ED. Denies vomiting, dysuria, constipation/diarrhea, palpitations.       Name of [facility]YN Physician: [facility]    POB:  G0    Pgyn: Denies fibroids, endometriosis, STI's, Abnormal pap smears. Hx of polycystic ovaries     Home meds: HCTZ,  - inhaler PRN (last used a year ago)    Hospital Meds:   MEDICATIONS  (STANDING):    MEDICATIONS  (PRN):      Allergies    Corn (Stomach Upset; Rash; Short breath)  No Known Drug Allergies    Intolerances        PAST MEDICAL & SURGICAL HISTORY:  HTN (hypertension)      FAMILY HISTORY: HTN      Social History:  Denies smoking use, drug use, alcohol use.      Vital Signs Last 24 Hrs  T(C): 37.6 (16 Aug 2019 06:08), Max: 37.6 (16 Aug 2019 00:08)  T(F): 99.7 (16 Aug 2019 06:08), Max: 99.7 (16 Aug 2019 00:08)  HR: 110 (16 Aug 2019 06:08) (110 - 127)  BP: 161/71 (16 Aug 2019 06:08) (140/99 - 161/71)  BP(mean): --  RR: 20 (16 Aug 2019 06:08) (18 - 20)  SpO2: 100% (16 Aug 2019 06:08) (100% - 100%)    Physical Exam:   General: sitting comfortably in bed, NAD   HEENT: neck supple, full ROM  CV: RR S1S2 no m/r/g  Lungs: CTA b/l, good air flow b/l   Back: No CVA tenderness  Abd: Soft, non-tender, non-distended.  Bowel sounds present.    :  Small amount of Bleeding on pad.   External labia wnl.  Bimanual exam with no cervical motion tenderness, uterus wnl, adnexa non palpable b/l.  Cervix closed.   Speculum Exam: Mild active bleeding from os.   Ext: non-tender b/l, no edema   Skin: velvety hyperpigmented skin in creases of neck and groin    LABS:                              6.4    9.50  )-----------( 446      ( 16 Aug 2019 02:08 )             22.7         140  |  103  |  11  ----------------------------<  142<H>  4.0   |  22  |  0.58    Ca    9.4      16 Aug 2019 02:08    TPro  8.0  /  Alb  4.8  /  TBili  0.4  /  DBili  x   /  AST  34<H>  /  ALT  44<H>  /  AlkPhos  73      I&O's Detail    PT/INR - ( 16 Aug 2019 02:08 )   PT: 13.4 SEC;   INR: 1.17          PTT - ( 16 Aug 2019 02:08 )  PTT:34.1 SEC      RADIOLOGY & ADDITIONAL STUDIES:    < from: US Transvaginal (19 @ 05:43) >  INTERPRETATION:  Clinical indication: Vaginal bleeding. LMP 2019.   Serum beta-hCG.    Technique:  Transvaginal ultrasound of the pelvis was performed.   Grayscale, color Doppler and spectral Doppler were utilized.     Comparison: 3/21/2013.    Findings:     Uterus: Measures 8.1 x 5.5 x 6.8 cm. Unremarkable.  Endometrium: Measures 1.1 cm in thickness. Heterogeneous echotexture,   which may be due to blood products/debris. No significantly increased   vascularity.  Right ovary: Measures 2.2 x 3.0 x 2.9 cm. Small follicles. Flow present.   Left ovary: Measures 2.6 x 1.9 x 2.8 cm. Small follicles. Flow present.   Additional: No adnexal mass. No free fluid.    Impression:    Heterogeneous endometrium, which may be due to blood products/debris.   Endometrium is suboptimally evaluated on this study. If the patient's   symptoms persist, additional imaging (sonohysterogram) and/or direct   visualization may be obtained to assess for potential underlying lesion.     < end of copied text >

## 2019-08-16 NOTE — ED ADULT NURSE NOTE - OBJECTIVE STATEMENT
pt received in rm 13 AAO x 3. pt reports vaginal bleed x 3 weeks with passing clots that are quarter sized, soaking about 4-5 pads in a day. pt also reports pain to umbilical area and lower back. pt denies sob, chest pain, n/v, fevers, chills, urinary symptoms. respirations even and unlabored, pulses present x 4 extremities. abdomen is soft, non distended and non tender to touch. 22g iv placed to right hand.

## 2019-08-16 NOTE — ED PROVIDER NOTE - OBJECTIVE STATEMENT
40yo F with hx of vaginal bleeding requiring transfusion presents with vaginal bleeding for 3 weeks. Pt has been having 5 pads of clots / bleeding for the last 3 weeks. last 5 days she was feeling lightheaded and SOB with exertion, worsening. Does not have OB she follows with. Not pregnant per patient.   PMH: HTN

## 2019-08-16 NOTE — ED CDU PROVIDER INITIAL DAY NOTE - OBJECTIVE STATEMENT
Patient  is a 39 year old female who states she had gotten her menses on 7/22/19. States since that time she has been bleeding. States  she  uses 5-6 pads daily often with heavy clotting. States that she had  been feeling lightheaded. She denies shortness of breath Patient  is a 39 year old female who states she had gotten her menses on 7/22/19. States since that time she has been bleeding. States  she  uses 5-6 pads daily often with heavy clotting. States that she had  been feeling lightheaded. She denies shortness of breath. States she has not taken birth control for the past 2 years.

## 2019-08-17 VITALS
SYSTOLIC BLOOD PRESSURE: 159 MMHG | DIASTOLIC BLOOD PRESSURE: 101 MMHG | HEART RATE: 94 BPM | RESPIRATION RATE: 18 BRPM | TEMPERATURE: 98 F | OXYGEN SATURATION: 100 %

## 2019-08-17 LAB
BASOPHILS # BLD AUTO: 0.06 K/UL — SIGNIFICANT CHANGE UP (ref 0–0.2)
BASOPHILS # BLD AUTO: 0.07 K/UL — SIGNIFICANT CHANGE UP (ref 0–0.2)
BASOPHILS NFR BLD AUTO: 0.8 % — SIGNIFICANT CHANGE UP (ref 0–2)
BASOPHILS NFR BLD AUTO: 1 % — SIGNIFICANT CHANGE UP (ref 0–2)
EOSINOPHIL # BLD AUTO: 0.18 K/UL — SIGNIFICANT CHANGE UP (ref 0–0.5)
EOSINOPHIL # BLD AUTO: 0.26 K/UL — SIGNIFICANT CHANGE UP (ref 0–0.5)
EOSINOPHIL NFR BLD AUTO: 2.5 % — SIGNIFICANT CHANGE UP (ref 0–6)
EOSINOPHIL NFR BLD AUTO: 3.6 % — SIGNIFICANT CHANGE UP (ref 0–6)
HCT VFR BLD CALC: 25 % — LOW (ref 34.5–45)
HCT VFR BLD CALC: 30.5 % — LOW (ref 34.5–45)
HGB BLD-MCNC: 7.6 G/DL — LOW (ref 11.5–15.5)
HGB BLD-MCNC: 9.1 G/DL — LOW (ref 11.5–15.5)
IMM GRANULOCYTES NFR BLD AUTO: 0.8 % — SIGNIFICANT CHANGE UP (ref 0–1.5)
IMM GRANULOCYTES NFR BLD AUTO: 1.2 % — SIGNIFICANT CHANGE UP (ref 0–1.5)
LYMPHOCYTES # BLD AUTO: 2.21 K/UL — SIGNIFICANT CHANGE UP (ref 1–3.3)
LYMPHOCYTES # BLD AUTO: 2.45 K/UL — SIGNIFICANT CHANGE UP (ref 1–3.3)
LYMPHOCYTES # BLD AUTO: 30.9 % — SIGNIFICANT CHANGE UP (ref 13–44)
LYMPHOCYTES # BLD AUTO: 33.9 % — SIGNIFICANT CHANGE UP (ref 13–44)
MCHC RBC-ENTMCNC: 23 PG — LOW (ref 27–34)
MCHC RBC-ENTMCNC: 23.3 PG — LOW (ref 27–34)
MCHC RBC-ENTMCNC: 29.8 % — LOW (ref 32–36)
MCHC RBC-ENTMCNC: 30.4 % — LOW (ref 32–36)
MCV RBC AUTO: 75.8 FL — LOW (ref 80–100)
MCV RBC AUTO: 78.2 FL — LOW (ref 80–100)
MONOCYTES # BLD AUTO: 0.73 K/UL — SIGNIFICANT CHANGE UP (ref 0–0.9)
MONOCYTES # BLD AUTO: 0.82 K/UL — SIGNIFICANT CHANGE UP (ref 0–0.9)
MONOCYTES NFR BLD AUTO: 10.2 % — SIGNIFICANT CHANGE UP (ref 2–14)
MONOCYTES NFR BLD AUTO: 11.4 % — SIGNIFICANT CHANGE UP (ref 2–14)
NEUTROPHILS # BLD AUTO: 3.54 K/UL — SIGNIFICANT CHANGE UP (ref 1.8–7.4)
NEUTROPHILS # BLD AUTO: 3.91 K/UL — SIGNIFICANT CHANGE UP (ref 1.8–7.4)
NEUTROPHILS NFR BLD AUTO: 49.1 % — SIGNIFICANT CHANGE UP (ref 43–77)
NEUTROPHILS NFR BLD AUTO: 54.6 % — SIGNIFICANT CHANGE UP (ref 43–77)
NRBC # FLD: 0.04 K/UL — SIGNIFICANT CHANGE UP (ref 0–0)
NRBC # FLD: 0.05 K/UL — SIGNIFICANT CHANGE UP (ref 0–0)
PLATELET # BLD AUTO: 341 K/UL — SIGNIFICANT CHANGE UP (ref 150–400)
PLATELET # BLD AUTO: 357 K/UL — SIGNIFICANT CHANGE UP (ref 150–400)
PMV BLD: 10 FL — SIGNIFICANT CHANGE UP (ref 7–13)
PMV BLD: 9.9 FL — SIGNIFICANT CHANGE UP (ref 7–13)
RBC # BLD: 3.3 M/UL — LOW (ref 3.8–5.2)
RBC # BLD: 3.9 M/UL — SIGNIFICANT CHANGE UP (ref 3.8–5.2)
RBC # FLD: 14.9 % — HIGH (ref 10.3–14.5)
RBC # FLD: 15 % — HIGH (ref 10.3–14.5)
WBC # BLD: 7.16 K/UL — SIGNIFICANT CHANGE UP (ref 3.8–10.5)
WBC # BLD: 7.22 K/UL — SIGNIFICANT CHANGE UP (ref 3.8–10.5)
WBC # FLD AUTO: 7.16 K/UL — SIGNIFICANT CHANGE UP (ref 3.8–10.5)
WBC # FLD AUTO: 7.22 K/UL — SIGNIFICANT CHANGE UP (ref 3.8–10.5)

## 2019-08-17 PROCEDURE — 99217: CPT

## 2019-08-17 RX ORDER — HYDROCHLOROTHIAZIDE 25 MG
12.5 TABLET ORAL DAILY
Refills: 0 | Status: DISCONTINUED | OUTPATIENT
Start: 2019-08-17 | End: 2019-08-29

## 2019-08-17 RX ORDER — MEDROXYPROGESTERONE ACETATE 150 MG/ML
10 INJECTION, SUSPENSION, EXTENDED RELEASE INTRAMUSCULAR ONCE
Refills: 0 | Status: COMPLETED | OUTPATIENT
Start: 2019-08-17 | End: 2019-08-17

## 2019-08-17 RX ORDER — MEDROXYPROGESTERONE ACETATE 150 MG/ML
1 INJECTION, SUSPENSION, EXTENDED RELEASE INTRAMUSCULAR
Qty: 14 | Refills: 0
Start: 2019-08-17 | End: 2019-08-30

## 2019-08-17 RX ORDER — HYDROCHLOROTHIAZIDE 25 MG
12.5 TABLET ORAL ONCE
Refills: 0 | Status: COMPLETED | OUTPATIENT
Start: 2019-08-17 | End: 2019-08-17

## 2019-08-17 RX ORDER — AMLODIPINE BESYLATE 2.5 MG/1
2.5 TABLET ORAL DAILY
Refills: 0 | Status: DISCONTINUED | OUTPATIENT
Start: 2019-08-17 | End: 2019-08-29

## 2019-08-17 RX ADMIN — MEDROXYPROGESTERONE ACETATE 10 MILLIGRAM(S): 150 INJECTION, SUSPENSION, EXTENDED RELEASE INTRAMUSCULAR at 08:02

## 2019-08-17 RX ADMIN — Medication 12.5 MILLIGRAM(S): at 08:01

## 2019-08-17 RX ADMIN — AMLODIPINE BESYLATE 2.5 MILLIGRAM(S): 2.5 TABLET ORAL at 06:56

## 2019-08-17 RX ADMIN — Medication 12.5 MILLIGRAM(S): at 06:56

## 2019-08-17 NOTE — ED CDU PROVIDER DISPOSITION NOTE - CLINICAL COURSE
Pt is 40 y/o female with PMhx of DUB, required transfusions in the past in CDU for vag bleeding x 3 wks, noted to have 6.4, pt received 2 units of PRBC while in CDU with increase in Hb to 9.1. Seen and evaluated by gyn team, the bleeding was minimal in CDU, pt feels better. as per gyn pt may need to be discharged on Provera 5mg daily with gyn f/u. Pt denied abd pain, n/v/d, denies cp, sob, palpitations, ucg neg;

## 2019-08-17 NOTE — ED CDU PROVIDER SUBSEQUENT DAY NOTE - ATTENDING CONTRIBUTION TO CARE
Dr Bloch, ATTENDING MD-  I performed a face to face bedside interview with patient regarding history of present illness, review of symptoms and past medical history. I completed an independent physical exam.  I have discussed patient's plan of care with PA.   Documentation as above in the note.  Patient examined, well appearing NAD , scant vag bleeding, HEENT nl heart sounds nml lungs clear abd soft nontender, no CVA tenderness, neuro nml . BP initially 170/100- now 157/90 hr 90

## 2019-08-17 NOTE — ED CDU PROVIDER DISPOSITION NOTE - NSFOLLOWUPINSTRUCTIONS_ED_ALL_ED_FT
PLEASE MAKE SURE THAT YOU FOLLOW UP WITH GYN DOCTOR (CALL [phone]), TAKE PROVERA AS  PRESCRIBED. RETURN TO ER FOR CHEST PAIN, SHORTNESS OF BREATH PLEASE MAKE SURE THAT YOU FOLLOW UP WITH GYN DOCTOR (CALL [phone]), TAKE PROVERA AS  PRESCRIBED. RETURN TO ER FOR CHEST PAIN, SHORTNESS OF BREATH, increased vaginal bleeding, DIZZINESS;     Dysfunctional Uterine Bleeding  ImageDysfunctional uterine bleeding is abnormal bleeding from the uterus. Dysfunctional uterine bleeding includes:  A period that comes earlier or later than usual.  A period that is lighter, heavier, or has blood clots.  Bleeding between periods.  Skipping one or more periods.  Bleeding after sexual intercourse.  Bleeding after menopause.  Follow these instructions at home:  Pay attention to any changes in your symptoms. Follow these instructions to help with your condition:    Eating and drinking     Eat well-balanced meals. Include foods that are high in iron, such as liver, meat, shellfish, green leafy vegetables, and eggs.  If you become constipated:  Drink plenty of water.  Eat fruits and vegetables that are high in water and fiber, such as spinach, carrots, raspberries, apples, and rena.  Medicines     Take over-the-counter and prescription medicines only as told by your health care provider.  Do not change medicines without talking with your health care provider.  Aspirin or medicines that contain aspirin may make the bleeding worse. Do not take those medicines:  During the week before your period.  During your period.  If you were prescribed iron pills, take them as told by your health care provider. Iron pills help to replace iron that your body loses because of this condition.  Activity     If you need to change your sanitary pad or tampon more than one time every 2 hours:  Lie in bed with your feet raised (elevated).  Place a cold pack on your lower abdomen.  Rest as much as possible until the bleeding stops or slows down.  Do not try to lose weight until the bleeding has stopped and your blood iron level is back to normal.  Other Instructions     For two months, write down:  When your period starts.  When your period ends.  When any abnormal bleeding occurs.  What problems you notice.  Keep all follow up visits as told by your health care provider. This is important.  Contact a health care provider if:  You get light-headed or weak.  You have nausea and vomiting.  You cannot eat or drink without vomiting.  You feel dizzy or have diarrhea while you are taking medicines.  You are taking birth control pills or hormones, and you want to change them or stop taking them.  Get help right away if:  You develop a fever or chills.  You need to change your sanitary pad or tampon more than one time per hour.  Your bleeding becomes heavier, or your flow contains clots more often.  You develop pain in your abdomen.  You lose consciousness.  You develop a rash.

## 2019-08-17 NOTE — ED CDU PROVIDER DISPOSITION NOTE - ATTENDING CONTRIBUTION TO CARE
Dr Bloch, ATTENDING MD-  I performed a face to face bedside interview with patient regarding history of present illness, review of symptoms and past medical history. I completed an independent physical exam.  I have discussed patient's plan of care with PA.   Documentation as above in the note.  Patient with minimal bleeding, WEll appearing, HEENT nml lungs clear, heart sounds nml, abd soft nontender, obese, extrem no edema,.Okay for d/c

## 2019-08-17 NOTE — ED CDU PROVIDER SUBSEQUENT DAY NOTE - HISTORY
40 yo female, PMH obesity, HTN, anemia requiring transfusions in the past, presented to the ED for dysfunctional uterine bleeding; pt was evaluated in the ED, pt with sinus tachycardia in ED (100 - 127), Hb 6.4, TVUS: "Uterus: Measures 8.1 x 5.5 x 6.8 cm. Unremarkable.  Endometrium: Measures 1.1 cm in thickness. Heterogeneous echotexture, which may be due to blood products/debris. No significantly increased vascularity.  Right ovary: Measures 2.2 x 3.0 x 2.9 cm. Small follicles. Flow present.   Left ovary: Measures 2.6 x 1.9 x 2.8 cm. Small follicles. Flow present.   Additional: No adnexal mass. No free fluid."  Pt. was consented for PRBC transfusion, 2 units PRBCs were administered, pt had refused PRBC unit #3, stated she wanted to hold off until repeat CBC results.  Post-transfusion CBC was checked (performed 3 hours post-transfusion-completion): repeat Hb was 7.6.  Pt was informed of results, pt verbally agrees to additional unit PRBCs, will recheck CBC again after unit completion per usual process.  Pt. has had persisting sinus tachycardia ranging in low 100s up to 120s with movement.  Pt has verbalized feeling some overall symptomatic improvement after initial 2 units PRBCs given, reports vaginal bleeding has subjectively lessened slightly.  No other c/o.

## 2019-08-21 PROBLEM — N93.8 OTHER SPECIFIED ABNORMAL UTERINE AND VAGINAL BLEEDING: Chronic | Status: ACTIVE | Noted: 2019-08-17

## 2019-08-21 PROBLEM — E66.9 OBESITY, UNSPECIFIED: Chronic | Status: ACTIVE | Noted: 2019-08-17

## 2019-08-21 PROBLEM — D64.9 ANEMIA, UNSPECIFIED: Chronic | Status: ACTIVE | Noted: 2019-08-17

## 2019-08-29 ENCOUNTER — OUTPATIENT (OUTPATIENT)
Dept: OUTPATIENT SERVICES | Facility: HOSPITAL | Age: 39
LOS: 1 days | End: 2019-08-29

## 2019-08-29 ENCOUNTER — APPOINTMENT (OUTPATIENT)
Dept: OBGYN | Facility: HOSPITAL | Age: 39
End: 2019-08-29
Payer: MEDICAID

## 2019-08-29 VITALS
SYSTOLIC BLOOD PRESSURE: 140 MMHG | HEIGHT: 63 IN | BODY MASS INDEX: 38.8 KG/M2 | WEIGHT: 219 LBS | HEART RATE: 105 BPM | DIASTOLIC BLOOD PRESSURE: 100 MMHG

## 2019-08-29 DIAGNOSIS — N92.0 EXCESSIVE AND FREQUENT MENSTRUATION WITH REGULAR CYCLE: ICD-10-CM

## 2019-08-29 PROCEDURE — 58300 INSERT INTRAUTERINE DEVICE: CPT | Mod: GC

## 2019-08-29 PROCEDURE — 99213 OFFICE O/P EST LOW 20 MIN: CPT | Mod: GC

## 2019-08-29 RX ORDER — IBUPROFEN 600 MG/1
600 TABLET, FILM COATED ORAL
Qty: 0 | Refills: 0 | Status: COMPLETED | OUTPATIENT
Start: 2019-08-29

## 2019-08-29 RX ADMIN — IBUPROFEN 0 MG: 400 TABLET, FILM COATED ORAL at 00:00

## 2019-09-01 ENCOUNTER — OUTPATIENT (OUTPATIENT)
Dept: OUTPATIENT SERVICES | Facility: HOSPITAL | Age: 39
LOS: 1 days | End: 2019-09-01
Payer: MEDICAID

## 2019-09-01 PROCEDURE — G9001: CPT

## 2019-09-03 DIAGNOSIS — E28.2 POLYCYSTIC OVARIAN SYNDROME: ICD-10-CM

## 2019-09-16 DIAGNOSIS — Z71.89 OTHER SPECIFIED COUNSELING: ICD-10-CM

## 2019-09-25 ENCOUNTER — LABORATORY RESULT (OUTPATIENT)
Age: 39
End: 2019-09-25

## 2019-09-25 ENCOUNTER — OUTPATIENT (OUTPATIENT)
Dept: OUTPATIENT SERVICES | Facility: HOSPITAL | Age: 39
LOS: 1 days | End: 2019-09-25

## 2019-09-25 ENCOUNTER — APPOINTMENT (OUTPATIENT)
Dept: INTERNAL MEDICINE | Facility: CLINIC | Age: 39
End: 2019-09-25
Payer: MEDICAID

## 2019-09-25 VITALS — OXYGEN SATURATION: 99 % | HEART RATE: 107 BPM | WEIGHT: 221 LBS | BODY MASS INDEX: 39.16 KG/M2 | HEIGHT: 63 IN

## 2019-09-25 VITALS — DIASTOLIC BLOOD PRESSURE: 108 MMHG | HEART RATE: 92 BPM | SYSTOLIC BLOOD PRESSURE: 174 MMHG

## 2019-09-25 LAB
ALBUMIN SERPL ELPH-MCNC: 4.8 G/DL — SIGNIFICANT CHANGE UP (ref 3.3–5)
ALP SERPL-CCNC: 85 U/L — SIGNIFICANT CHANGE UP (ref 40–120)
ALT FLD-CCNC: 24 U/L — SIGNIFICANT CHANGE UP (ref 4–33)
ANION GAP SERPL CALC-SCNC: 15 MMO/L — HIGH (ref 7–14)
AST SERPL-CCNC: 22 U/L — SIGNIFICANT CHANGE UP (ref 4–32)
BASOPHILS # BLD AUTO: 0.08 K/UL — SIGNIFICANT CHANGE UP (ref 0–0.2)
BASOPHILS NFR BLD AUTO: 0.9 % — SIGNIFICANT CHANGE UP (ref 0–2)
BILIRUB SERPL-MCNC: 0.4 MG/DL — SIGNIFICANT CHANGE UP (ref 0.2–1.2)
BUN SERPL-MCNC: 8 MG/DL — SIGNIFICANT CHANGE UP (ref 7–23)
CALCIUM SERPL-MCNC: 9.8 MG/DL — SIGNIFICANT CHANGE UP (ref 8.4–10.5)
CHLORIDE SERPL-SCNC: 102 MMOL/L — SIGNIFICANT CHANGE UP (ref 98–107)
CHOLEST SERPL-MCNC: 209 MG/DL — HIGH (ref 120–199)
CO2 SERPL-SCNC: 23 MMOL/L — SIGNIFICANT CHANGE UP (ref 22–31)
CREAT SERPL-MCNC: 0.5 MG/DL — SIGNIFICANT CHANGE UP (ref 0.5–1.3)
EOSINOPHIL # BLD AUTO: 0.46 K/UL — SIGNIFICANT CHANGE UP (ref 0–0.5)
EOSINOPHIL NFR BLD AUTO: 5.1 % — SIGNIFICANT CHANGE UP (ref 0–6)
FERRITIN SERPL-MCNC: 9.33 NG/ML — LOW (ref 15–150)
GLUCOSE SERPL-MCNC: 112 MG/DL — HIGH (ref 70–99)
HBA1C BLD-MCNC: 5.7 % — HIGH (ref 4–5.6)
HCT VFR BLD CALC: 36.9 % — SIGNIFICANT CHANGE UP (ref 34.5–45)
HDLC SERPL-MCNC: 38 MG/DL — LOW (ref 45–65)
HGB BLD-MCNC: 10.4 G/DL — LOW (ref 11.5–15.5)
IMM GRANULOCYTES NFR BLD AUTO: 0.8 % — SIGNIFICANT CHANGE UP (ref 0–1.5)
IRON SATN MFR SERPL: 22 UG/DL — LOW (ref 30–160)
IRON SATN MFR SERPL: 482 UG/DL — SIGNIFICANT CHANGE UP (ref 140–530)
LIPID PNL WITH DIRECT LDL SERPL: 158 MG/DL — SIGNIFICANT CHANGE UP
LYMPHOCYTES # BLD AUTO: 1.9 K/UL — SIGNIFICANT CHANGE UP (ref 1–3.3)
LYMPHOCYTES # BLD AUTO: 21.1 % — SIGNIFICANT CHANGE UP (ref 13–44)
MCHC RBC-ENTMCNC: 21.2 PG — LOW (ref 27–34)
MCHC RBC-ENTMCNC: 28.2 % — LOW (ref 32–36)
MCV RBC AUTO: 75.3 FL — LOW (ref 80–100)
MONOCYTES # BLD AUTO: 0.66 K/UL — SIGNIFICANT CHANGE UP (ref 0–0.9)
MONOCYTES NFR BLD AUTO: 7.3 % — SIGNIFICANT CHANGE UP (ref 2–14)
NEUTROPHILS # BLD AUTO: 5.84 K/UL — SIGNIFICANT CHANGE UP (ref 1.8–7.4)
NEUTROPHILS NFR BLD AUTO: 64.8 % — SIGNIFICANT CHANGE UP (ref 43–77)
NRBC # FLD: 0 K/UL — SIGNIFICANT CHANGE UP (ref 0–0)
PLATELET # BLD AUTO: 298 K/UL — SIGNIFICANT CHANGE UP (ref 150–400)
PMV BLD: 10.4 FL — SIGNIFICANT CHANGE UP (ref 7–13)
POTASSIUM SERPL-MCNC: 3.7 MMOL/L — SIGNIFICANT CHANGE UP (ref 3.5–5.3)
POTASSIUM SERPL-SCNC: 3.7 MMOL/L — SIGNIFICANT CHANGE UP (ref 3.5–5.3)
PROT SERPL-MCNC: 8.3 G/DL — SIGNIFICANT CHANGE UP (ref 6–8.3)
RBC # BLD: 4.9 M/UL — SIGNIFICANT CHANGE UP (ref 3.8–5.2)
RBC # FLD: 16.6 % — HIGH (ref 10.3–14.5)
SODIUM SERPL-SCNC: 140 MMOL/L — SIGNIFICANT CHANGE UP (ref 135–145)
TRIGL SERPL-MCNC: 143 MG/DL — SIGNIFICANT CHANGE UP (ref 10–149)
UIBC SERPL-MCNC: 459.7 UG/DL — HIGH (ref 110–370)
WBC # BLD: 9.01 K/UL — SIGNIFICANT CHANGE UP (ref 3.8–10.5)
WBC # FLD AUTO: 9.01 K/UL — SIGNIFICANT CHANGE UP (ref 3.8–10.5)

## 2019-09-25 PROCEDURE — 99214 OFFICE O/P EST MOD 30 MIN: CPT | Mod: GC

## 2019-09-25 NOTE — PHYSICAL EXAM
[No Acute Distress] : no acute distress [Well Nourished] : well nourished [Normal Sclera/Conjunctiva] : normal sclera/conjunctiva [PERRL] : pupils equal round and reactive to light [EOMI] : extraocular movements intact [No Respiratory Distress] : no respiratory distress  [Clear to Auscultation] : lungs were clear to auscultation bilaterally [No Accessory Muscle Use] : no accessory muscle use [Normal Rate] : normal rate  [Regular Rhythm] : with a regular rhythm [Normal S1, S2] : normal S1 and S2 [No Murmur] : no murmur heard [Pedal Pulses Present] : the pedal pulses are present [No Edema] : there was no peripheral edema [Soft] : abdomen soft [Non Tender] : non-tender [Non-distended] : non-distended [No CVA Tenderness] : no CVA  tenderness [No Spinal Tenderness] : no spinal tenderness [No Joint Swelling] : no joint swelling [No Rash] : no rash [Grossly Normal Strength/Tone] : grossly normal strength/tone [No Focal Deficits] : no focal deficits [Normal Affect] : the affect was normal [Normal Gait] : normal gait [Alert and Oriented x3] : oriented to person, place, and time [Normal Insight/Judgement] : insight and judgment were intact

## 2019-09-26 NOTE — ASSESSMENT
[FreeTextEntry1] : 40 y/o F with a PMH significant for HTN, PCOS, and asthma who presents for routine follow up. \par \par #Vaginal bleed\par - now has some intermittent spotting s/p Mirena\par - will check CBC, iron studies\par - will send iron supplementation for now as well\par \par #HTN\par - off all meds for past 4 months\par - will re-start amlodipine 10 mg daily and chlorthalidone 12.5 mg daily\par - will re-assess in 5 weeks at next clinic cycle\par \par #Asthma\par - no acute issues at this time; no exacerbations in past year\par - c/w ventolin\par \par #L breast nodular sclerosing adenosis\par - ordered rpt left breast U/S to assess\par \par #PCOS\par - s/p Mirena 8/2019, still has some spotting and cramping, will f/u w/ OBGYN in one week\par \par #L knee pain\par - has some component of chronic pain from fall years ago\par - will order some PT, advised pain control standing for next week to improve inflammation\par - no swelling, warmth, or erythema for more concerning etiologies \par \par #HCM\par - PAP w/ HPV negative 3/2018, rpt in 5 yrs\par - flu vaccine at next visit\par - will check A1c, lipids\par \par d/w Dr. Broussard\par rtc in 1 month to review labs, BP, etc\par Frantz Lacey MD\par PGY-3, [employer] [facility]

## 2019-09-26 NOTE — REVIEW OF SYSTEMS
[Palpitations] : palpitations [Joint Pain] : joint pain [Fever] : no fever [Chills] : no chills [Vision Problems] : no vision problems [Chest Pain] : no chest pain [Lower Ext Edema] : no lower extremity edema [Shortness Of Breath] : no shortness of breath [Wheezing] : no wheezing [Cough] : no cough [Abdominal Pain] : no abdominal pain [Nausea] : no nausea [Constipation] : no constipation [Diarrhea] : diarrhea [Vomiting] : no vomiting [Dysuria] : no dysuria [FreeTextEntry9] : left knee pain

## 2019-09-26 NOTE — HISTORY OF PRESENT ILLNESS
[de-identified] : 38 y/o F with a PMH significant for HTN, PCOS, and asthma who presents for follow up. Of note, patient was recently in the ED last month for heavy vaginal bleeding, s/p 3U PRBCs. Has been having some intermittent spotting and lower abdominal pain, requiring Aleve 2-3x/week. \par \par HTN: Patient has run out of meds and has not been taking any for past 4 months. Endorses feeling palpitations and "heartbeat in ear" 1-2x/week.  \par \par L breast nodular sclerosing adenosis: Patient had core biopsy done last year, and has had no issues since. A follow up US was recommended at that time in one year. \par \par L knee pain: endorses intermittent left knee pain that is worse w/ ambulation but improved w/ rest.

## 2019-09-27 DIAGNOSIS — D64.9 ANEMIA, UNSPECIFIED: ICD-10-CM

## 2019-09-27 DIAGNOSIS — I10 ESSENTIAL (PRIMARY) HYPERTENSION: ICD-10-CM

## 2019-09-27 DIAGNOSIS — R73.09 OTHER ABNORMAL GLUCOSE: ICD-10-CM

## 2019-09-27 DIAGNOSIS — E28.2 POLYCYSTIC OVARIAN SYNDROME: ICD-10-CM

## 2019-09-27 DIAGNOSIS — N60.22 FIBROADENOSIS OF LEFT BREAST: ICD-10-CM

## 2019-09-27 DIAGNOSIS — Z00.00 ENCOUNTER FOR GENERAL ADULT MEDICAL EXAMINATION WITHOUT ABNORMAL FINDINGS: ICD-10-CM

## 2019-09-27 DIAGNOSIS — M25.562 PAIN IN LEFT KNEE: ICD-10-CM

## 2019-10-02 ENCOUNTER — APPOINTMENT (OUTPATIENT)
Dept: OBGYN | Facility: HOSPITAL | Age: 39
End: 2019-10-02
Payer: MEDICAID

## 2019-10-02 ENCOUNTER — OUTPATIENT (OUTPATIENT)
Dept: OUTPATIENT SERVICES | Facility: HOSPITAL | Age: 39
LOS: 1 days | End: 2019-10-02

## 2019-10-02 VITALS — HEART RATE: 100 BPM | DIASTOLIC BLOOD PRESSURE: 101 MMHG | SYSTOLIC BLOOD PRESSURE: 160 MMHG

## 2019-10-02 VITALS — BODY MASS INDEX: 38.98 KG/M2 | HEIGHT: 63 IN | WEIGHT: 220 LBS

## 2019-10-02 PROCEDURE — 99213 OFFICE O/P EST LOW 20 MIN: CPT | Mod: GE

## 2019-10-03 DIAGNOSIS — T83.32XA DISPLACEMENT OF INTRAUTERINE CONTRACEPTIVE DEVICE, INITIAL ENCOUNTER: ICD-10-CM

## 2019-10-03 NOTE — HISTORY OF PRESENT ILLNESS
[Definite:  ___ (Date)] : the last menstrual period was [unfilled] [Regular Cycle Intervals] : periods have been regular [Frequency: Q ___ days] : menstrual periods occur approximately every [unfilled] days [Sexually Active] : is sexually active [Monogamous] : is monogamous [Male ___] : [unfilled] male

## 2019-10-16 NOTE — PROCEDURE
[Locate IUD] : locate IUD [Pelvic Sonogram] : pelvic sonogram [Anteverted] : anteverted [FreeTextEntry3] : IUD noted to be in place at the fundus [FreeTextEntry4] : IUD strings not visible on speculum exam, IUD noted to be in place on ultrasound

## 2019-10-16 NOTE — PHYSICAL EXAM
[Awake] : awake [Soft] : soft [Oriented x3] : oriented to person, place, and time [No Lesions] : no genitalia lesions [Normal] : uterus [Pink Rugae] : pink rugae [Anteversion] : anteverted [Uterine Adnexae] : were not tender and not enlarged [RRR, No Murmurs] : RRR, no murmurs [CTAB] : CTAB [Alert] : not alert [Acute Distress] : no acute distress [Tender] : non tender [Distended] : not distended [H/Smegaly] : no hepatosplenomegaly [Depressed Mood] : not depressed [Flat Affect] : affect not flat [IUD String] : did not have an IUD string protruding out [Tenderness] : nontender [Enlarged ___ wks] : not enlarged [Adnexa Tenderness] : were not tender

## 2019-10-30 ENCOUNTER — OUTPATIENT (OUTPATIENT)
Dept: OUTPATIENT SERVICES | Facility: HOSPITAL | Age: 39
LOS: 1 days | End: 2019-10-30

## 2019-10-30 ENCOUNTER — APPOINTMENT (OUTPATIENT)
Dept: INTERNAL MEDICINE | Facility: CLINIC | Age: 39
End: 2019-10-30
Payer: MEDICAID

## 2019-10-30 VITALS — WEIGHT: 220 LBS | HEIGHT: 63 IN | BODY MASS INDEX: 38.98 KG/M2

## 2019-10-30 VITALS — DIASTOLIC BLOOD PRESSURE: 94 MMHG | HEART RATE: 84 BPM | SYSTOLIC BLOOD PRESSURE: 162 MMHG

## 2019-10-30 PROCEDURE — 99214 OFFICE O/P EST MOD 30 MIN: CPT | Mod: GC

## 2019-10-30 RX ORDER — EPINEPHRINE 0.3 MG/.3ML
0.3 INJECTION INTRAMUSCULAR
Qty: 1 | Refills: 0 | Status: ACTIVE | COMMUNITY
Start: 2019-10-30 | End: 1900-01-01

## 2019-10-30 NOTE — PHYSICAL EXAM
[No Acute Distress] : no acute distress [Well Nourished] : well nourished [Normal Sclera/Conjunctiva] : normal sclera/conjunctiva [PERRL] : pupils equal round and reactive to light [EOMI] : extraocular movements intact [No JVD] : no jugular venous distention [Supple] : supple [No Respiratory Distress] : no respiratory distress  [No Accessory Muscle Use] : no accessory muscle use [Clear to Auscultation] : lungs were clear to auscultation bilaterally [Normal Rate] : normal rate  [Regular Rhythm] : with a regular rhythm [Normal S1, S2] : normal S1 and S2 [No Murmur] : no murmur heard [Pedal Pulses Present] : the pedal pulses are present [No Edema] : there was no peripheral edema [Soft] : abdomen soft [Non Tender] : non-tender [Non-distended] : non-distended [Normal Supraclavicular Nodes] : no supraclavicular lymphadenopathy [Normal Posterior Cervical Nodes] : no posterior cervical lymphadenopathy [Normal Anterior Cervical Nodes] : no anterior cervical lymphadenopathy [No CVA Tenderness] : no CVA  tenderness [No Spinal Tenderness] : no spinal tenderness [No Joint Swelling] : no joint swelling [No Rash] : no rash [Coordination Grossly Intact] : coordination grossly intact [No Focal Deficits] : no focal deficits [Normal Gait] : normal gait [Normal Affect] : the affect was normal [Alert and Oriented x3] : oriented to person, place, and time [Normal Insight/Judgement] : insight and judgment were intact

## 2019-11-01 DIAGNOSIS — Z91.018 ALLERGY TO OTHER FOODS: ICD-10-CM

## 2019-11-01 DIAGNOSIS — M25.562 PAIN IN LEFT KNEE: ICD-10-CM

## 2019-11-01 DIAGNOSIS — N60.22 FIBROADENOSIS OF LEFT BREAST: ICD-10-CM

## 2019-11-01 DIAGNOSIS — Z23 ENCOUNTER FOR IMMUNIZATION: ICD-10-CM

## 2019-11-01 DIAGNOSIS — D64.9 ANEMIA, UNSPECIFIED: ICD-10-CM

## 2019-11-01 DIAGNOSIS — I10 ESSENTIAL (PRIMARY) HYPERTENSION: ICD-10-CM

## 2019-11-01 NOTE — END OF VISIT
[] : Resident [FreeTextEntry3] : 40 y/o F with a PMH significant for  uncontrolled HTN, PCOS, and asthma who presents for follow up\par Now complaint with medications. No complaints today.\par HTN- Uncontrolled. Amlodipine 10mg QD. Add Chlorthalidone 12.5mg QD.\par Iron Deficiency Anemia- Tolerating PO Um003ma QD. Will r/p 1 month.\par Rest of plan as above\par RTC 1month for BP check and Iron Panel\par

## 2019-11-01 NOTE — REVIEW OF SYSTEMS
[Fever] : no fever [Chills] : no chills [Fatigue] : no fatigue [Vision Problems] : no vision problems [Chest Pain] : no chest pain [Lower Ext Edema] : no lower extremity edema [Shortness Of Breath] : no shortness of breath [Wheezing] : no wheezing [Cough] : no cough [Dyspnea on Exertion] : no dyspnea on exertion [Abdominal Pain] : no abdominal pain [Nausea] : no nausea [Constipation] : no constipation [Diarrhea] : diarrhea [Vomiting] : no vomiting [Dysuria] : no dysuria [Joint Pain] : no joint pain [Headache] : no headache

## 2019-11-01 NOTE — ASSESSMENT
[FreeTextEntry1] : 38 y/o F with a PMH significant for HTN, PCOS, and asthma who presents for routine follow up. \par \par #Vaginal bleed\par - Hgb 10.4 at last visit; iron studies consistent w/ iron deficiency anemia, low ferritin and low % saturation\par - c/w PO iron supplementation\par - will re-check iron panel and Hgb at next visit in one month\par \par #HTN\par - BP today 162/94, improved from prior 174/108 last month\par - c/w amlodipine 10 mg daily\par - will re-start chlorthalidone 12.5 mg daily today\par - will re-assess in 5 weeks at next clinic cycle\par \par #Asthma\par - no acute issues at this time; no exacerbations in past year\par - refilled ventolin\par \par #L breast nodular sclerosing adenosis\par - ordered rpt left breast U/S to assess\par \par #PCOS\par - s/p Mirena 8/2019\par - no longer w/ abdominal pain or spotting\par - f/u OBGYN prn\par \par #L knee pain\par - improving w/ PT, not requiring pain medications regularly\par \par #Corn allergy\par - patient advised to keep Epi-pen for emergencies and use if breathing difficulties arise when she is possibly exposed to corn\par - also advised to use benadryl and pepcid in cases of rash/hives\par \par #HCM\par - PAP w/ HPV negative 3/2018, rpt in 5 yrs\par - flu shot today\par \par d/w Dr. Sargent\par rtc in 1 month to review BP and check iron studies and Hgb\par Frantz Lacey MD\par PGY-3, [employer] [facility]

## 2019-11-01 NOTE — HISTORY OF PRESENT ILLNESS
[de-identified] : 38 y/o F with a PMH significant for HTN, PCOS, and asthma who presents for follow up. At the last visit, patient was restarted on amlodipine and chlorthalidone. However, the chlorthalidone was not sent to the pharmacy, so she has only been taking amlodipine 10 mg daily. She has been taking this daily without any missed doses. She no longer has any lower abdominal pain or spotting. Per her last visit w/ OBGYN 4 weeks ago, her Mirena is in good position. She has also been taking her iron supplementation daily w/o any missed doses. 3 weeks ago, she ate something that possibly had corn in it, making her break out w/ hives and a rash. She also had some SOB, though she does not think she was wheezing actively. She does worry that her asthma will flare now that the cold weather is starting. She is going to PT twice weekly for her left knee pain and states it is improving slowly w/ exercises, ultrasound. She otherwise denies any recent fevers/chills, CP, dyspnea, cough, n/v/d/c. She has not had any LE swelling following re-initiation of amlodipine.

## 2019-12-04 ENCOUNTER — LABORATORY RESULT (OUTPATIENT)
Age: 39
End: 2019-12-04

## 2019-12-04 ENCOUNTER — APPOINTMENT (OUTPATIENT)
Dept: INTERNAL MEDICINE | Facility: CLINIC | Age: 39
End: 2019-12-04
Payer: MEDICAID

## 2019-12-04 ENCOUNTER — OUTPATIENT (OUTPATIENT)
Dept: OUTPATIENT SERVICES | Facility: HOSPITAL | Age: 39
LOS: 1 days | End: 2019-12-04

## 2019-12-04 VITALS
OXYGEN SATURATION: 97 % | HEIGHT: 63 IN | HEART RATE: 99 BPM | WEIGHT: 216.5 LBS | BODY MASS INDEX: 38.36 KG/M2 | SYSTOLIC BLOOD PRESSURE: 130 MMHG | DIASTOLIC BLOOD PRESSURE: 80 MMHG

## 2019-12-04 LAB
BASOPHILS # BLD AUTO: 0.06 K/UL — SIGNIFICANT CHANGE UP (ref 0–0.2)
BASOPHILS NFR BLD AUTO: 0.6 % — SIGNIFICANT CHANGE UP (ref 0–2)
EOSINOPHIL # BLD AUTO: 0.42 K/UL — SIGNIFICANT CHANGE UP (ref 0–0.5)
EOSINOPHIL NFR BLD AUTO: 4.5 % — SIGNIFICANT CHANGE UP (ref 0–6)
FERRITIN SERPL-MCNC: 58.14 NG/ML — SIGNIFICANT CHANGE UP (ref 15–150)
HCT VFR BLD CALC: 41.3 % — SIGNIFICANT CHANGE UP (ref 34.5–45)
HGB BLD-MCNC: 13.3 G/DL — SIGNIFICANT CHANGE UP (ref 11.5–15.5)
IMM GRANULOCYTES NFR BLD AUTO: 0.8 % — SIGNIFICANT CHANGE UP (ref 0–1.5)
IRON SATN MFR SERPL: 417 UG/DL — SIGNIFICANT CHANGE UP (ref 140–530)
IRON SATN MFR SERPL: 47 UG/DL — SIGNIFICANT CHANGE UP (ref 30–160)
LYMPHOCYTES # BLD AUTO: 2.35 K/UL — SIGNIFICANT CHANGE UP (ref 1–3.3)
LYMPHOCYTES # BLD AUTO: 24.9 % — SIGNIFICANT CHANGE UP (ref 13–44)
MCHC RBC-ENTMCNC: 24.9 PG — LOW (ref 27–34)
MCHC RBC-ENTMCNC: 32.2 % — SIGNIFICANT CHANGE UP (ref 32–36)
MCV RBC AUTO: 77.3 FL — LOW (ref 80–100)
MONOCYTES # BLD AUTO: 0.59 K/UL — SIGNIFICANT CHANGE UP (ref 0–0.9)
MONOCYTES NFR BLD AUTO: 6.3 % — SIGNIFICANT CHANGE UP (ref 2–14)
NEUTROPHILS # BLD AUTO: 5.92 K/UL — SIGNIFICANT CHANGE UP (ref 1.8–7.4)
NEUTROPHILS NFR BLD AUTO: 62.9 % — SIGNIFICANT CHANGE UP (ref 43–77)
NRBC # FLD: 0 K/UL — SIGNIFICANT CHANGE UP (ref 0–0)
PLATELET # BLD AUTO: 360 K/UL — SIGNIFICANT CHANGE UP (ref 150–400)
PMV BLD: 9.9 FL — SIGNIFICANT CHANGE UP (ref 7–13)
RBC # BLD: 5.34 M/UL — HIGH (ref 3.8–5.2)
RBC # FLD: 17.7 % — HIGH (ref 10.3–14.5)
UIBC SERPL-MCNC: 370 UG/DL — SIGNIFICANT CHANGE UP (ref 110–370)
WBC # BLD: 9.42 K/UL — SIGNIFICANT CHANGE UP (ref 3.8–10.5)
WBC # FLD AUTO: 9.42 K/UL — SIGNIFICANT CHANGE UP (ref 3.8–10.5)

## 2019-12-04 PROCEDURE — 99214 OFFICE O/P EST MOD 30 MIN: CPT | Mod: GC

## 2019-12-05 LAB — TRANSFERRIN SERPL-MCNC: 324 MG/DL — SIGNIFICANT CHANGE UP (ref 200–360)

## 2019-12-06 VITALS — DIASTOLIC BLOOD PRESSURE: 88 MMHG | SYSTOLIC BLOOD PRESSURE: 144 MMHG

## 2019-12-06 DIAGNOSIS — I10 ESSENTIAL (PRIMARY) HYPERTENSION: ICD-10-CM

## 2019-12-06 DIAGNOSIS — D64.9 ANEMIA, UNSPECIFIED: ICD-10-CM

## 2019-12-06 DIAGNOSIS — Z00.00 ENCOUNTER FOR GENERAL ADULT MEDICAL EXAMINATION WITHOUT ABNORMAL FINDINGS: ICD-10-CM

## 2019-12-06 NOTE — PHYSICAL EXAM
[No Acute Distress] : no acute distress [Well Nourished] : well nourished [No Respiratory Distress] : no respiratory distress  [No Accessory Muscle Use] : no accessory muscle use [Regular Rhythm] : with a regular rhythm [Normal Rate] : normal rate  [Clear to Auscultation] : lungs were clear to auscultation bilaterally [Normal S1, S2] : normal S1 and S2 [No Murmur] : no murmur heard [No Edema] : there was no peripheral edema [Pedal Pulses Present] : the pedal pulses are present [Soft] : abdomen soft [Non-distended] : non-distended [Non Tender] : non-tender [No CVA Tenderness] : no CVA  tenderness [No Spinal Tenderness] : no spinal tenderness [No Focal Deficits] : no focal deficits [No Joint Swelling] : no joint swelling [No Rash] : no rash [Normal Affect] : the affect was normal [Alert and Oriented x3] : oriented to person, place, and time [Normal Insight/Judgement] : insight and judgment were intact

## 2019-12-23 NOTE — PHYSICAL EXAM
[Awake] : awake [Alert] : alert [Soft] : soft [Oriented x3] : oriented to person, place, and time [No Lesions] : no genitalia lesions [Labia Majora] : labia major [Labia Minora] : labia minora [Normal] : clitoris [Pink Rugae] : pink rugae [No Bleeding] : there was no active vaginal bleeding [Nulliparous] : was nulliparous [Uterine Adnexae] : were not tender and not enlarged [RRR, No Murmurs] : RRR, no murmurs [CTAB] : CTAB [Acute Distress] : no acute distress [Tender] : non tender [Distended] : not distended [Discharge] : had no discharge [Motion Tenderness] : there was no cervical motion tenderness [Enlarged ___ wks] : not enlarged [Tenderness] : nontender [Adnexa Tenderness] : were not tender [Ovarian Mass (___ Cm)] : there were no adnexal masses

## 2019-12-23 NOTE — REVIEW OF SYSTEMS
[Fever] : no fever [Chills] : no chills [Chest Pain] : no chest pain [Palpitations] : no palpitations [Dyspnea] : no shortness of breath [Cough] : no cough [Vomiting] : no vomiting [Abdominal Pain] : no abdominal pain [Constipation] : no constipation [Dysuria] : no dysuria [Pelvic Pain] : no pelvic pain [Frequency] : no frequency [Urgency] : no urgency [Headache] : no headache [Dizziness] : no dizziness

## 2019-12-23 NOTE — PROCEDURE
[Risks] : risks [IUD Placement] : intrauterine device (IUD) placement [Benefits] : benefits [Alternatives] : alternatives [Patient] : patient [Infection] : infection [Bleeding] : bleeding [Pain] : pain [Expulsion] : expulsion [Failure] : failure [Uterine Perforation] : uterine perforation [CONSENT OBTAINED] : written consent was obtained prior to the procedure. [Neg Pregnancy Test] : a pregnancy test was negative [Betadine] : Prepped with Betadine [None] : none [Uterus Sounded to ___cm] : sounded to [unfilled]Ucm [Tenaculum] : a single toothed tenaculum [Post Placement Transvag. US] : post placement transvaginal ultrasound completed [Easy Passage] : allowed easy passage of a uterine sound without dilation [Mirena IUD] : The Mirena IUD was inserted past the internal cervical os. The IUD was then gently inserted upwards toward the fundus.  The IUD strings were cut to an appropriate length. [Tolerated Well] : the patient tolerated the procedure well [Motrin/Ibuprofen] : Motrin/Ibuprofen [de-identified] : Menorrhagia  [de-identified] : Bleeding from anterior tenaculum site, monsels applied with excellent hemostasis

## 2020-01-02 ENCOUNTER — APPOINTMENT (OUTPATIENT)
Dept: OPHTHALMOLOGY | Facility: CLINIC | Age: 40
End: 2020-01-02
Payer: MEDICAID

## 2020-01-02 ENCOUNTER — NON-APPOINTMENT (OUTPATIENT)
Age: 40
End: 2020-01-02

## 2020-01-02 PROCEDURE — 92004 COMPRE OPH EXAM NEW PT 1/>: CPT

## 2020-01-08 ENCOUNTER — LABORATORY RESULT (OUTPATIENT)
Age: 40
End: 2020-01-08

## 2020-01-08 ENCOUNTER — OUTPATIENT (OUTPATIENT)
Dept: OUTPATIENT SERVICES | Facility: HOSPITAL | Age: 40
LOS: 1 days | End: 2020-01-08

## 2020-01-08 ENCOUNTER — APPOINTMENT (OUTPATIENT)
Dept: INTERNAL MEDICINE | Facility: CLINIC | Age: 40
End: 2020-01-08

## 2020-01-08 VITALS
DIASTOLIC BLOOD PRESSURE: 90 MMHG | HEIGHT: 63 IN | HEART RATE: 128 BPM | SYSTOLIC BLOOD PRESSURE: 158 MMHG | WEIGHT: 216 LBS | BODY MASS INDEX: 38.27 KG/M2

## 2020-01-08 VITALS — SYSTOLIC BLOOD PRESSURE: 152 MMHG | DIASTOLIC BLOOD PRESSURE: 94 MMHG

## 2020-01-08 DIAGNOSIS — I10 ESSENTIAL (PRIMARY) HYPERTENSION: ICD-10-CM

## 2020-01-08 DIAGNOSIS — D50.9 IRON DEFICIENCY ANEMIA, UNSPECIFIED: ICD-10-CM

## 2020-01-08 DIAGNOSIS — Z00.00 ENCOUNTER FOR GENERAL ADULT MEDICAL EXAMINATION WITHOUT ABNORMAL FINDINGS: ICD-10-CM

## 2020-01-08 LAB — HBA1C BLD-MCNC: 7.1 % — HIGH (ref 4–5.6)

## 2020-01-08 RX ORDER — CHLORHEXIDINE GLUCONATE 4 %
325 (65 FE) LIQUID (ML) TOPICAL 3 TIMES DAILY
Qty: 270 | Refills: 2 | Status: DISCONTINUED | COMMUNITY
Start: 2019-09-25 | End: 2020-01-08

## 2020-01-08 NOTE — PHYSICAL EXAM
[No Acute Distress] : no acute distress [Well Nourished] : well nourished [EOMI] : extraocular movements intact [Normal Sclera/Conjunctiva] : normal sclera/conjunctiva [PERRL] : pupils equal round and reactive to light [No JVD] : no jugular venous distention [No Respiratory Distress] : no respiratory distress  [No Lymphadenopathy] : no lymphadenopathy [No Accessory Muscle Use] : no accessory muscle use [Clear to Auscultation] : lungs were clear to auscultation bilaterally [Normal Rate] : normal rate  [Regular Rhythm] : with a regular rhythm [Normal S1, S2] : normal S1 and S2 [No Murmur] : no murmur heard [Pedal Pulses Present] : the pedal pulses are present [No Edema] : there was no peripheral edema [Soft] : abdomen soft [Non Tender] : non-tender [Non-distended] : non-distended [Normal Supraclavicular Nodes] : no supraclavicular lymphadenopathy [Normal Posterior Cervical Nodes] : no posterior cervical lymphadenopathy [Normal Anterior Cervical Nodes] : no anterior cervical lymphadenopathy [No Joint Swelling] : no joint swelling [No Rash] : no rash [No Focal Deficits] : no focal deficits [Normal Affect] : the affect was normal [Alert and Oriented x3] : oriented to person, place, and time [Normal Insight/Judgement] : insight and judgment were intact [Normal Mood] : the mood was normal

## 2020-01-09 LAB — HIV 1+2 AB+HIV1 P24 AG SERPL QL IA: SIGNIFICANT CHANGE UP

## 2020-01-09 NOTE — ASSESSMENT
[FreeTextEntry1] : 40 y/o F with a PMH significant for HTN, PCOS, and asthma who presents for follow up of anti-hypertensive regimen mgmt. \par \par 40 y/o F with a PMH significant for HTN, PCOS, and asthma who presents for routine follow up. \par \par #Vaginal bleed\par - iron studies and CBC significantly improved today compared to hospital values\par - c/w PO iron supplementation for now and will discuss discontinuation in the future\par \par #HTN\par - BP today 144/88, improved from prior \par - c/w amlodipine 10 mg daily and chlorthalidone 12.5 mg daily\par - given that it is still elevated, advised patient to take meds at bedtime to improve control\par - if she is still hypertensive in 5 weeks, will consider either increasing chlorthalidone or adding low dose losartan\par \par #Asthma\par - no acute issues at this time; no exacerbations in past year\par - refilled ventolin\par \par #PCOS\par - s/p Mirena 8/2019\par - no longer w/ abdominal pain or spotting\par - f/u OBGYN prn\par \par #L knee pain\par - improving w/ PT, not requiring pain medications regularly\par \par #Corn allergy\par - patient advised to keep Epi-pen for emergencies and use if breathing difficulties arise when she is possibly exposed to corn\par - also advised to use benadryl and pepcid in cases of rash/hives\par \par #HCM\par - PAP w/ HPV negative 3/2018, rpt in 5 yrs\par - flu shot given last visit\par \par d/w Dr. Broussard\par rtc in 1 month to review BP\par Frantz Lacey MD\par PGY-3, [employer] [facility]

## 2020-01-09 NOTE — HISTORY OF PRESENT ILLNESS
[de-identified] : This is a 38 y/o F with a PMH significant for HTN, PCOS, and asthma who presents for follow up of anti-hypertensive regimen mgmt. Patient has been taking her amlodipine and chlorthalidone daily w/o missed doses, and she has also been taking her iron supplementation. No further spotting or bleeding. She is going to PT twice weekly for her left knee pain and states it is improving slowly w/ exercises, ultrasound. She otherwise denies any recent fevers/chills, CP, dyspnea, cough, n/v/d/c.

## 2020-01-14 NOTE — ASSESSMENT
[FreeTextEntry1] : 38 y/o F with a PMH significant for HTN, PCOS, and asthma who presents for follow up of anti-hypertensive regimen mgmt. \par \par #Iron deficiency anemia 2/2 vaginal bleed\par - patient can be trialed off iron supplementation as she is improved and acute iron deficiency was from vaginal bleed\par - will monitor ferritin and CBC later in the year\par \par #HTN\par - BP still elevated today to 152/94\par - c/w amlodipine 10 mg daily\par - will increase chlorthalidone to 25 mg daily\par - stressed importance of diet and exercise \par \par #HCM\par - PAP w/ HPV negative 3/2018, rpt in 5 yrs\par - flu shot given last visit\par - HIV and A1c ordered for today\par - will give pneumovax at next visit\par - will also discuss breast U/S to f/u on nodular sclerosing adenosis\par \par d/w Dr. Sargent\par rtc in 1 month to review BP\par Frantz Lacey MD\par PGY-3, [employer] [facility]

## 2020-01-14 NOTE — REVIEW OF SYSTEMS
[Fever] : no fever [Chills] : no chills [Vision Problems] : no vision problems [Chest Pain] : no chest pain [Lower Ext Edema] : no lower extremity edema [Shortness Of Breath] : no shortness of breath [Wheezing] : no wheezing [Cough] : no cough [Dyspnea on Exertion] : no dyspnea on exertion [Abdominal Pain] : no abdominal pain [Nausea] : no nausea [Constipation] : no constipation [Diarrhea] : diarrhea [Vomiting] : no vomiting [Dysuria] : no dysuria

## 2020-01-14 NOTE — END OF VISIT
[] : Resident [FreeTextEntry3] : 39 year old obese woman history of uncontrolled HTN, PCOS presents for follow up. Here for BP check. \par Reports BP at home typically in SBPs 130s. No other complaints today. Takes medications as prescribed. \par VS reviewed. BP 150s/90s (states she took medications this AM). Other wnl.  \par PE as above. \par Medications as above. \par HCM reviewed. \par \par A/P 39 year old woman with uncontrolled HTN. \par Current regimen: Chlorthalidone 12.5mg QD and Amlodipine 10mg QD. \par Will increase Chlorthalidone to 25mg QD. \par Continue diet and exercise. \par Will bring in BP machine at next visit as patient unclear why there is a discrepancy between her machine and in office BP.  \par \par UTD on Influenza vaccine. HIV Screen pending.  PapSmear/HPV 3/2018 negative.\par \par Rest of plan as above\par \par RTC 5 weeks

## 2020-01-14 NOTE — HISTORY OF PRESENT ILLNESS
[de-identified] : This is a 40 y/o F with a PMH significant for HTN, PCOS, and asthma who presents for follow up of anti-hypertensive regimen mgmt. Patient has been taking her amlodipine and chlorthalidone daily w/o missed doses, and she has also been taking her iron supplementation. No further spotting or bleeding. She otherwise denies any recent fevers/chills, CP, dyspnea, cough, n/v/d/c.

## 2020-01-22 ENCOUNTER — APPOINTMENT (OUTPATIENT)
Dept: OBGYN | Facility: HOSPITAL | Age: 40
End: 2020-01-22

## 2020-02-12 ENCOUNTER — OUTPATIENT (OUTPATIENT)
Dept: OUTPATIENT SERVICES | Facility: HOSPITAL | Age: 40
LOS: 1 days | End: 2020-02-12

## 2020-02-12 ENCOUNTER — APPOINTMENT (OUTPATIENT)
Dept: INTERNAL MEDICINE | Facility: CLINIC | Age: 40
End: 2020-02-12
Payer: MEDICAID

## 2020-02-12 ENCOUNTER — NON-APPOINTMENT (OUTPATIENT)
Age: 40
End: 2020-02-12

## 2020-02-12 VITALS — SYSTOLIC BLOOD PRESSURE: 142 MMHG | DIASTOLIC BLOOD PRESSURE: 86 MMHG | HEART RATE: 108 BPM

## 2020-02-12 VITALS
BODY MASS INDEX: 37.03 KG/M2 | WEIGHT: 209 LBS | DIASTOLIC BLOOD PRESSURE: 100 MMHG | HEART RATE: 131 BPM | SYSTOLIC BLOOD PRESSURE: 140 MMHG | OXYGEN SATURATION: 99 % | HEIGHT: 63 IN

## 2020-02-12 DIAGNOSIS — Z00.00 ENCOUNTER FOR GENERAL ADULT MEDICAL EXAMINATION WITHOUT ABNORMAL FINDINGS: ICD-10-CM

## 2020-02-12 DIAGNOSIS — E11.9 TYPE 2 DIABETES MELLITUS WITHOUT COMPLICATIONS: ICD-10-CM

## 2020-02-12 DIAGNOSIS — N60.22 FIBROADENOSIS OF LEFT BREAST: ICD-10-CM

## 2020-02-12 DIAGNOSIS — E28.2 POLYCYSTIC OVARIAN SYNDROME: ICD-10-CM

## 2020-02-12 DIAGNOSIS — R00.0 TACHYCARDIA, UNSPECIFIED: ICD-10-CM

## 2020-02-12 DIAGNOSIS — I10 ESSENTIAL (PRIMARY) HYPERTENSION: ICD-10-CM

## 2020-02-12 PROCEDURE — 99213 OFFICE O/P EST LOW 20 MIN: CPT | Mod: GE

## 2020-02-12 NOTE — ASSESSMENT
[FreeTextEntry1] : 40 y/o F with a PMH significant for HTN, PCOS, and asthma who presents for follow up of anti-hypertensive regimen mgmt. \par \par #Iron deficiency anemia 2/2 vaginal bleed\par - will monitor ferritin and CBC later in the year\par \par #HTN\par - though BP still slightly elevated today to 142/86, patient getting more improved readings at home\par - c/w amlodipine 10 mg daily and chlorthalidone to 25 mg daily\par - stressed importance of diet and exercise \par \par #Tachycardia\par - she has persistently elevated HR's for many months w/ no discernible etiology\par - EKG shows sinus tachycardia, rate 108, normal intervals and segments\par - Previous EKG 2015 reviewed, sinus tachycardia with no acute ischemic changes\par - ordered TTE to evaluate for structural heart disease, though she has no murmurs and no family h/o cardiac disease\par \par #NIDDM2\par - A1c from last month 7.1%\par - given concomitant h/o PCOS, will start metformin 500 mg daily to evaluate tolerability; counseled on side effects\par - Consider statin therapy at next visit given her DM,  in 9/19, and HTN. Indicated, but will hold off as patient already starting 1 new medication today\par \par #Nodular sclerosing adenosis\par - ordered left breast U/S to f/u\par \par #HCM\par - PAP w/ HPV negative 3/2018, rpt in 5 yrs\par - flu shot given 10/2019\par - patient deferred pneumovax till next visit\par \par d/w Dr. Mast\par rtc in 3 months to review BP and A1c and new medication tolerance\par Frantz Lacey MD\par PGY-3, [employer] [facility]

## 2020-02-12 NOTE — REVIEW OF SYSTEMS
[Fever] : no fever [Chills] : no chills [Vision Problems] : no vision problems [Chest Pain] : no chest pain [Palpitations] : no palpitations [Lower Ext Edema] : no lower extremity edema [Orthopnea] : no orthopnea [Shortness Of Breath] : no shortness of breath [Wheezing] : no wheezing [Cough] : no cough [Dyspnea on Exertion] : no dyspnea on exertion [Abdominal Pain] : no abdominal pain [Nausea] : no nausea [Constipation] : no constipation [Diarrhea] : diarrhea [Joint Pain] : no joint pain [Vomiting] : no vomiting

## 2020-02-12 NOTE — HISTORY OF PRESENT ILLNESS
[FreeTextEntry1] : Hypertension follow up [de-identified] : This is a 40 y/o F with a PMH significant for HTN, PCOS, and asthma who presents for follow up of anti-hypertensive regimen mgmt. Patient has been taking her amlodipine and chlorthalidone daily w/o missed doses, and she has been exercising a few times/week. No further vaginal spotting or bleeding. She otherwise denies any recent fevers/chills, CP, dyspnea, cough, n/v/d/c. She has been taking her BP measurements at home, and is getting mid-high 130s/80s persistently.

## 2020-02-12 NOTE — PHYSICAL EXAM
[No Acute Distress] : no acute distress [Normal Sclera/Conjunctiva] : normal sclera/conjunctiva [Well Nourished] : well nourished [No JVD] : no jugular venous distention [PERRL] : pupils equal round and reactive to light [EOMI] : extraocular movements intact [No Accessory Muscle Use] : no accessory muscle use [No Respiratory Distress] : no respiratory distress  [Clear to Auscultation] : lungs were clear to auscultation bilaterally [Normal S1, S2] : normal S1 and S2 [Regular Rhythm] : with a regular rhythm [Normal Rate] : normal rate  [No Edema] : there was no peripheral edema [No Murmur] : no murmur heard [Pedal Pulses Present] : the pedal pulses are present [Soft] : abdomen soft [Non Tender] : non-tender [Non-distended] : non-distended [No CVA Tenderness] : no CVA  tenderness [No Joint Swelling] : no joint swelling [No Spinal Tenderness] : no spinal tenderness [No Rash] : no rash [Grossly Normal Strength/Tone] : grossly normal strength/tone [Coordination Grossly Intact] : coordination grossly intact [Normal Affect] : the affect was normal [Normal Insight/Judgement] : insight and judgment were intact [Normal Gait] : normal gait [No Focal Deficits] : no focal deficits

## 2020-05-27 ENCOUNTER — APPOINTMENT (OUTPATIENT)
Dept: INTERNAL MEDICINE | Facility: CLINIC | Age: 40
End: 2020-05-27

## 2021-08-13 ENCOUNTER — RESULT REVIEW (OUTPATIENT)
Age: 41
End: 2021-08-13

## 2021-08-13 ENCOUNTER — OUTPATIENT (OUTPATIENT)
Dept: OUTPATIENT SERVICES | Facility: HOSPITAL | Age: 41
LOS: 1 days | End: 2021-08-13

## 2021-08-13 ENCOUNTER — APPOINTMENT (OUTPATIENT)
Dept: INTERNAL MEDICINE | Facility: CLINIC | Age: 41
End: 2021-08-13
Payer: MEDICAID

## 2021-08-13 VITALS
SYSTOLIC BLOOD PRESSURE: 148 MMHG | OXYGEN SATURATION: 98 % | DIASTOLIC BLOOD PRESSURE: 114 MMHG | HEART RATE: 114 BPM | HEIGHT: 63 IN

## 2021-08-13 DIAGNOSIS — R73.09 OTHER ABNORMAL GLUCOSE: ICD-10-CM

## 2021-08-13 DIAGNOSIS — N92.1 EXCESSIVE AND FREQUENT MENSTRUATION WITH IRREGULAR CYCLE: ICD-10-CM

## 2021-08-13 DIAGNOSIS — E11.9 TYPE 2 DIABETES MELLITUS W/OUT COMPLICATIONS: ICD-10-CM

## 2021-08-13 DIAGNOSIS — M25.562 PAIN IN LEFT KNEE: ICD-10-CM

## 2021-08-13 DIAGNOSIS — J45.20 MILD INTERMITTENT ASTHMA, UNCOMPLICATED: ICD-10-CM

## 2021-08-13 DIAGNOSIS — N64.4 MASTODYNIA: ICD-10-CM

## 2021-08-13 DIAGNOSIS — Z91.018 ALLERGY TO OTHER FOODS: ICD-10-CM

## 2021-08-13 LAB
ALBUMIN SERPL ELPH-MCNC: 4.8 G/DL — SIGNIFICANT CHANGE UP (ref 3.3–5)
ALP SERPL-CCNC: 75 U/L — SIGNIFICANT CHANGE UP (ref 40–120)
ALT FLD-CCNC: 14 U/L — SIGNIFICANT CHANGE UP (ref 4–33)
ANION GAP SERPL CALC-SCNC: 14 MMOL/L — SIGNIFICANT CHANGE UP (ref 7–14)
AST SERPL-CCNC: 17 U/L — SIGNIFICANT CHANGE UP (ref 4–32)
BASOPHILS # BLD AUTO: 0.07 K/UL — SIGNIFICANT CHANGE UP (ref 0–0.2)
BASOPHILS NFR BLD AUTO: 0.8 % — SIGNIFICANT CHANGE UP (ref 0–2)
BILIRUB SERPL-MCNC: 0.4 MG/DL — SIGNIFICANT CHANGE UP (ref 0.2–1.2)
BUN SERPL-MCNC: 8 MG/DL — SIGNIFICANT CHANGE UP (ref 7–23)
CALCIUM SERPL-MCNC: 10.3 MG/DL — SIGNIFICANT CHANGE UP (ref 8.4–10.5)
CHLORIDE SERPL-SCNC: 103 MMOL/L — SIGNIFICANT CHANGE UP (ref 98–107)
CHOLEST SERPL-MCNC: 249 MG/DL — HIGH
CO2 SERPL-SCNC: 24 MMOL/L — SIGNIFICANT CHANGE UP (ref 22–31)
CREAT SERPL-MCNC: 0.67 MG/DL — SIGNIFICANT CHANGE UP (ref 0.5–1.3)
EOSINOPHIL # BLD AUTO: 0.24 K/UL — SIGNIFICANT CHANGE UP (ref 0–0.5)
EOSINOPHIL NFR BLD AUTO: 2.8 % — SIGNIFICANT CHANGE UP (ref 0–6)
FERRITIN SERPL-MCNC: 6 NG/ML — LOW (ref 15–150)
GLUCOSE BLDC GLUCOMTR-MCNC: 103
GLUCOSE SERPL-MCNC: 100 MG/DL — HIGH (ref 70–99)
HBA1C MFR BLD HPLC: 5.4
HCT VFR BLD CALC: 34.9 % — SIGNIFICANT CHANGE UP (ref 34.5–45)
HDLC SERPL-MCNC: 60 MG/DL — SIGNIFICANT CHANGE UP
HGB BLD-MCNC: 10.5 G/DL — LOW (ref 11.5–15.5)
IANC: 5.38 K/UL — SIGNIFICANT CHANGE UP (ref 1.5–8.5)
IMM GRANULOCYTES NFR BLD AUTO: 0.7 % — SIGNIFICANT CHANGE UP (ref 0–1.5)
IRON SATN MFR SERPL: 20 UG/DL — LOW (ref 30–160)
IRON SATN MFR SERPL: 4 % — LOW (ref 14–50)
LIPID PNL WITH DIRECT LDL SERPL: 167 MG/DL — HIGH
LYMPHOCYTES # BLD AUTO: 2.26 K/UL — SIGNIFICANT CHANGE UP (ref 1–3.3)
LYMPHOCYTES # BLD AUTO: 25.9 % — SIGNIFICANT CHANGE UP (ref 13–44)
MCHC RBC-ENTMCNC: 22.9 PG — LOW (ref 27–34)
MCHC RBC-ENTMCNC: 30.1 GM/DL — LOW (ref 32–36)
MCV RBC AUTO: 76 FL — LOW (ref 80–100)
MONOCYTES # BLD AUTO: 0.71 K/UL — SIGNIFICANT CHANGE UP (ref 0–0.9)
MONOCYTES NFR BLD AUTO: 8.1 % — SIGNIFICANT CHANGE UP (ref 2–14)
NEUTROPHILS # BLD AUTO: 5.38 K/UL — SIGNIFICANT CHANGE UP (ref 1.8–7.4)
NEUTROPHILS NFR BLD AUTO: 61.7 % — SIGNIFICANT CHANGE UP (ref 43–77)
NON HDL CHOLESTEROL: 189 MG/DL — HIGH
NRBC # BLD: 0 /100 WBCS — SIGNIFICANT CHANGE UP
NRBC # FLD: 0 K/UL — SIGNIFICANT CHANGE UP
PLATELET # BLD AUTO: 368 K/UL — SIGNIFICANT CHANGE UP (ref 150–400)
POTASSIUM SERPL-MCNC: 4.3 MMOL/L — SIGNIFICANT CHANGE UP (ref 3.5–5.3)
POTASSIUM SERPL-SCNC: 4.3 MMOL/L — SIGNIFICANT CHANGE UP (ref 3.5–5.3)
PROT SERPL-MCNC: 8 G/DL — SIGNIFICANT CHANGE UP (ref 6–8.3)
RBC # BLD: 4.59 M/UL — SIGNIFICANT CHANGE UP (ref 3.8–5.2)
RBC # FLD: 14 % — SIGNIFICANT CHANGE UP (ref 10.3–14.5)
SODIUM SERPL-SCNC: 141 MMOL/L — SIGNIFICANT CHANGE UP (ref 135–145)
T4 FREE SERPL-MCNC: 1.2 NG/DL — SIGNIFICANT CHANGE UP (ref 0.9–1.8)
TIBC SERPL-MCNC: 511 UG/DL — HIGH (ref 220–430)
TRIGL SERPL-MCNC: 111 MG/DL — SIGNIFICANT CHANGE UP
TSH SERPL-MCNC: 1.71 UIU/ML — SIGNIFICANT CHANGE UP (ref 0.27–4.2)
UIBC SERPL-MCNC: 491 UG/DL — HIGH (ref 110–370)
VIT B12 SERPL-MCNC: 597 PG/ML — SIGNIFICANT CHANGE UP (ref 200–900)
WBC # BLD: 8.72 K/UL — SIGNIFICANT CHANGE UP (ref 3.8–10.5)
WBC # FLD AUTO: 8.72 K/UL — SIGNIFICANT CHANGE UP (ref 3.8–10.5)

## 2021-08-13 PROCEDURE — 99214 OFFICE O/P EST MOD 30 MIN: CPT | Mod: GC

## 2021-08-13 RX ORDER — AMLODIPINE BESYLATE 10 MG/1
10 TABLET ORAL DAILY
Qty: 90 | Refills: 3 | Status: ACTIVE | COMMUNITY
Start: 2018-05-25 | End: 1900-01-01

## 2021-08-16 PROBLEM — N64.4 BREAST PAIN IN FEMALE: Status: RESOLVED | Noted: 2018-02-06 | Resolved: 2021-08-16

## 2021-08-16 PROBLEM — Z91.018 HISTORY OF FOOD ALLERGY: Status: RESOLVED | Noted: 2019-10-30 | Resolved: 2021-08-16

## 2021-08-16 PROBLEM — J45.20 MILD INTERMITTENT ASTHMA, UNSPECIFIED WHETHER COMPLICATED: Status: ACTIVE | Noted: 2019-12-04

## 2021-08-16 PROBLEM — M25.562 LEFT KNEE PAIN: Status: RESOLVED | Noted: 2019-09-25 | Resolved: 2021-08-16

## 2021-08-16 LAB — ALDOST SERPL-MCNC: 14.3 NG/DL — SIGNIFICANT CHANGE UP

## 2021-08-16 NOTE — ASSESSMENT
[FreeTextEntry1] : 42 y/o F with a PMH significant for HTN, PCOS, and asthma who presents for follow up.\par \par #Iron deficiency anemia\par - menorrhagia has improved over past year\par - Hb at 10.5 on today's labs\par - labs today consistent with EMIL (low iron, high TIBC, low transferrin sat, low ferritin)\par - unclear etiology of iron deficiency as pt reports cessation of bleeding\par \par #dietary changes\par - pt reports recent change to vegan-style diet - avoiding gluten and animal products and consuming mostly salad\par - will f/u B12 level\par \par #HTN\par - pt reports BP is persistently elevated at home and in office\par - pt appears reluctant to take BP meds, however importance of doing so was stressed - encouraged to at least take amlodipine 10, will assess whether amenable to adding a 2nd agent if need be on next visit\par - pt was referred to cardiology given unusually degree of HTN in a person age 41 in addition to her persistent tachycardia\par - will f/u renin activity, aldosterone\par - pt denies symptoms suggestive of CHRIS\par \par #Tachycardia\par - she has persistently elevated HR's for many months w/ no discernible etiology\par - TSH today wnl\par - previous EKG shows sinus tachycardia, rate 108, normal intervals and segments\par - ordered TTE to evaluate for structural heart disease, though she has no murmurs and no family h/o cardiac disease\par - referred to cardiology as above\par \par #NIDDM2\par - A1c was 7.1% in 2020, is now 5.4\par - Appears to have resolved with dietary modification\par \par #HLD\par - today's lipid profile with , HDL 60, \par - will address hyperlipidemia on next visit and pt's willingness to initiate statin therapy\par \par #HCM\par - PAP w/ HPV negative 3/2018, rpt in 2023\par - mammogram ordered\par \par #asthma\par - inhaler has been reordered\par \par \par d/w Dr. Salazar\par rtc in 2 weeks to review for BP check, 10 wk for f/u\par Rafat Jones MD\par PGY-1, [employer] [facility]

## 2021-08-16 NOTE — HISTORY OF PRESENT ILLNESS
[de-identified] : Pt is a 42 y/o F with a PMH of HTN, PCOS, and asthma who presents for a routine visit. Pt reports that she has not had a prescription for her anti-hypertensive medications, and her BP has been elevated to 150s/90s at home. Pt also reports that she has noticed her nails have stopped growing and she has had some hair loss. Reports recent dietary change, with plant based diet, and has avoided animal products and gluten. Pt reports she is willing to get COVID vaccine. Also requests refill for her inhaler. Reports she previously had menorrhagia but this has resolved with her IUD and has had regular periods since 2019. Pt reports she experiences palpitations intermittently and is aware she has a fast heart rate. Otherwise, pt with not acute complaints.

## 2021-08-16 NOTE — END OF VISIT
[] : Resident [FreeTextEntry3] : Patient seen and examined with Dr. Jones, patient was congratulated on her weight loss due to change in plant based diet, will check new metabolic labs (A1C no longer in DM range) including b12 given nail changes.  HTN uncontrolled, will check electrolytes for evidence of hyperaldo, conisder sleep study in the future.  Has sinus tachycardia as well, will check TSH.  Will check ECHO given HTN at young age.  Patient is interested in startign mammography screening and the COVID vaccine, and we discussed resources to secure an appointment.

## 2021-08-16 NOTE — PHYSICAL EXAM
[No Acute Distress] : no acute distress [Well Nourished] : well nourished [Well Developed] : well developed [Well-Appearing] : well-appearing [Normal Sclera/Conjunctiva] : normal sclera/conjunctiva [PERRL] : pupils equal round and reactive to light [EOMI] : extraocular movements intact [Normal Outer Ear/Nose] : the outer ears and nose were normal in appearance [Normal Oropharynx] : the oropharynx was normal [No JVD] : no jugular venous distention [No Lymphadenopathy] : no lymphadenopathy [Supple] : supple [No Respiratory Distress] : no respiratory distress  [No Accessory Muscle Use] : no accessory muscle use [Clear to Auscultation] : lungs were clear to auscultation bilaterally [Regular Rhythm] : with a regular rhythm [Normal S1, S2] : normal S1 and S2 [No Murmur] : no murmur heard [No Edema] : there was no peripheral edema [No Extremity Clubbing/Cyanosis] : no extremity clubbing/cyanosis [Soft] : abdomen soft [Non Tender] : non-tender [Non-distended] : non-distended [Normal Posterior Cervical Nodes] : no posterior cervical lymphadenopathy [Normal Anterior Cervical Nodes] : no anterior cervical lymphadenopathy [No CVA Tenderness] : no CVA  tenderness [No Spinal Tenderness] : no spinal tenderness [No Joint Swelling] : no joint swelling [Grossly Normal Strength/Tone] : grossly normal strength/tone [No Rash] : no rash [Coordination Grossly Intact] : coordination grossly intact [No Focal Deficits] : no focal deficits [Normal Gait] : normal gait [Normal Affect] : the affect was normal [Normal Insight/Judgement] : insight and judgment were intact [de-identified] : tachycardic

## 2021-08-17 DIAGNOSIS — J45.20 MILD INTERMITTENT ASTHMA, UNCOMPLICATED: ICD-10-CM

## 2021-08-17 DIAGNOSIS — R00.0 TACHYCARDIA, UNSPECIFIED: ICD-10-CM

## 2021-08-17 DIAGNOSIS — D64.9 ANEMIA, UNSPECIFIED: ICD-10-CM

## 2021-08-17 DIAGNOSIS — E11.9 TYPE 2 DIABETES MELLITUS WITHOUT COMPLICATIONS: ICD-10-CM

## 2021-08-17 DIAGNOSIS — I10 ESSENTIAL (PRIMARY) HYPERTENSION: ICD-10-CM

## 2021-08-17 LAB — RENIN PLAS-CCNC: 1.23 NG/ML/HR — SIGNIFICANT CHANGE UP (ref 0.17–5.38)

## 2021-08-27 ENCOUNTER — APPOINTMENT (OUTPATIENT)
Dept: INTERNAL MEDICINE | Facility: CLINIC | Age: 41
End: 2021-08-27

## 2021-08-27 ENCOUNTER — OUTPATIENT (OUTPATIENT)
Dept: OUTPATIENT SERVICES | Facility: HOSPITAL | Age: 41
LOS: 1 days | End: 2021-08-27

## 2021-08-27 VITALS — TEMPERATURE: 99.2 F

## 2021-08-27 RX ORDER — CHLORTHALIDONE 25 MG/1
25 TABLET ORAL DAILY
Qty: 30 | Refills: 2 | Status: COMPLETED | COMMUNITY
Start: 2018-02-06 | End: 2021-08-27

## 2021-09-20 ENCOUNTER — APPOINTMENT (OUTPATIENT)
Dept: CV DIAGNOSITCS | Facility: HOSPITAL | Age: 41
End: 2021-09-20
Payer: MEDICAID

## 2021-09-20 ENCOUNTER — OUTPATIENT (OUTPATIENT)
Dept: OUTPATIENT SERVICES | Facility: HOSPITAL | Age: 41
LOS: 1 days | End: 2021-09-20

## 2021-09-20 DIAGNOSIS — R00.0 TACHYCARDIA, UNSPECIFIED: ICD-10-CM

## 2021-09-20 PROCEDURE — 93306 TTE W/DOPPLER COMPLETE: CPT | Mod: 26

## 2021-10-05 ENCOUNTER — APPOINTMENT (OUTPATIENT)
Dept: INTERNAL MEDICINE | Facility: CLINIC | Age: 41
End: 2021-10-05

## 2021-10-22 ENCOUNTER — RESULT REVIEW (OUTPATIENT)
Age: 41
End: 2021-10-22

## 2021-10-22 ENCOUNTER — OUTPATIENT (OUTPATIENT)
Dept: OUTPATIENT SERVICES | Facility: HOSPITAL | Age: 41
LOS: 1 days | End: 2021-10-22

## 2021-10-22 ENCOUNTER — APPOINTMENT (OUTPATIENT)
Dept: INTERNAL MEDICINE | Facility: CLINIC | Age: 41
End: 2021-10-22
Payer: MEDICAID

## 2021-10-22 VITALS
WEIGHT: 197 LBS | DIASTOLIC BLOOD PRESSURE: 90 MMHG | HEART RATE: 110 BPM | SYSTOLIC BLOOD PRESSURE: 132 MMHG | HEIGHT: 63 IN | BODY MASS INDEX: 34.91 KG/M2 | OXYGEN SATURATION: 98 %

## 2021-10-22 VITALS — TEMPERATURE: 97.5 F

## 2021-10-22 DIAGNOSIS — R00.0 TACHYCARDIA, UNSPECIFIED: ICD-10-CM

## 2021-10-22 LAB
BASOPHILS # BLD AUTO: 0.06 K/UL — SIGNIFICANT CHANGE UP (ref 0–0.2)
BASOPHILS NFR BLD AUTO: 0.8 % — SIGNIFICANT CHANGE UP (ref 0–2)
EOSINOPHIL # BLD AUTO: 0.33 K/UL — SIGNIFICANT CHANGE UP (ref 0–0.5)
EOSINOPHIL NFR BLD AUTO: 4.5 % — SIGNIFICANT CHANGE UP (ref 0–6)
FERRITIN SERPL-MCNC: 8 NG/ML — LOW (ref 15–150)
HCT VFR BLD CALC: 33.7 % — LOW (ref 34.5–45)
HGB BLD-MCNC: 10.1 G/DL — LOW (ref 11.5–15.5)
IANC: 4.37 K/UL — SIGNIFICANT CHANGE UP (ref 1.5–8.5)
IMM GRANULOCYTES NFR BLD AUTO: 0.7 % — SIGNIFICANT CHANGE UP (ref 0–1.5)
IRON SATN MFR SERPL: 25 UG/DL — LOW (ref 30–160)
IRON SATN MFR SERPL: 5 % — LOW (ref 14–50)
LYMPHOCYTES # BLD AUTO: 1.96 K/UL — SIGNIFICANT CHANGE UP (ref 1–3.3)
LYMPHOCYTES # BLD AUTO: 26.9 % — SIGNIFICANT CHANGE UP (ref 13–44)
MCHC RBC-ENTMCNC: 23.2 PG — LOW (ref 27–34)
MCHC RBC-ENTMCNC: 30 GM/DL — LOW (ref 32–36)
MCV RBC AUTO: 77.3 FL — LOW (ref 80–100)
MONOCYTES # BLD AUTO: 0.52 K/UL — SIGNIFICANT CHANGE UP (ref 0–0.9)
MONOCYTES NFR BLD AUTO: 7.1 % — SIGNIFICANT CHANGE UP (ref 2–14)
NEUTROPHILS # BLD AUTO: 4.37 K/UL — SIGNIFICANT CHANGE UP (ref 1.8–7.4)
NEUTROPHILS NFR BLD AUTO: 60 % — SIGNIFICANT CHANGE UP (ref 43–77)
NRBC # BLD: 0 /100 WBCS — SIGNIFICANT CHANGE UP
NRBC # FLD: 0 K/UL — SIGNIFICANT CHANGE UP
PLATELET # BLD AUTO: 348 K/UL — SIGNIFICANT CHANGE UP (ref 150–400)
RBC # BLD: 4.36 M/UL — SIGNIFICANT CHANGE UP (ref 3.8–5.2)
RBC # FLD: 15.7 % — HIGH (ref 10.3–14.5)
TIBC SERPL-MCNC: 471 UG/DL — HIGH (ref 220–430)
UIBC SERPL-MCNC: 446 UG/DL — HIGH (ref 110–370)
WBC # BLD: 7.29 K/UL — SIGNIFICANT CHANGE UP (ref 3.8–10.5)
WBC # FLD AUTO: 7.29 K/UL — SIGNIFICANT CHANGE UP (ref 3.8–10.5)

## 2021-10-22 PROCEDURE — 83020 HEMOGLOBIN ELECTROPHORESIS: CPT | Mod: 26

## 2021-10-22 PROCEDURE — 99214 OFFICE O/P EST MOD 30 MIN: CPT | Mod: GC

## 2021-10-22 RX ORDER — TRIAMCINOLONE ACETONIDE 1 MG/G
0.1 CREAM TOPICAL TWICE DAILY
Qty: 1 | Refills: 2 | Status: ACTIVE | COMMUNITY
Start: 2021-10-22 | End: 1900-01-01

## 2021-10-23 LAB — TRANSFERRIN SERPL-MCNC: 418 MG/DL — HIGH (ref 200–360)

## 2021-10-24 VITALS — DIASTOLIC BLOOD PRESSURE: 85 MMHG | SYSTOLIC BLOOD PRESSURE: 135 MMHG

## 2021-10-25 LAB
HEMOGLOBIN INTERPRETATION: SIGNIFICANT CHANGE UP
HGB A MFR BLD: 97.2 % — SIGNIFICANT CHANGE UP
HGB A2 MFR BLD: 2.5 % — SIGNIFICANT CHANGE UP (ref 2.4–3.5)
HGB F MFR BLD: <1 % — SIGNIFICANT CHANGE UP (ref 0–1.5)

## 2021-10-25 NOTE — END OF VISIT
[] : Resident [FreeTextEntry3] : patient seen and examined with Dr. Jones.  EMIL at last labs, does have hx of heavy menses in the past, improved since IUD in 2019, but still with heavy flow for first 3 days of periods, needing a change in pad every 4 hours).  Also with vegan diet.  Will repeat CBC anc iron studies along with HGB electrophoresis to r/o hgbopathy.  If patient has had response to iron supplementsation (thought only using in once every otherday, less concern for ongoing blood loss/GI source. BP has improved with amlodipine 10, and happily, patien tcompleted 2 covid mrna vaccines.

## 2021-10-25 NOTE — ASSESSMENT
[FreeTextEntry1] : Ms. Hernandez is 40 yo woman w/HTN, HLD, EMIL, PCOS, and asthma and a PMH of T2DM who presents for follow up.\par \par #Iron deficiency anemia\par Menorrhagia has improved over past year; less suspicious as source of bleed\par Hb 10.1 from 10.5 in August 2021\par Iron studies remain consistent w/EMIL (low iron, high TIBC, low transferrin sat, low ferritin); serum iron has increased marginally w/supplementation\par Patient has also recently adopted vegan diet - perhaps nutritional deficiency?\par - advised pt to continue w/iron supplementation\par - will f/u Hb electrophoresis to determine ?hemoglobinopathy contributing to anemia\par - will place GI referral given no improvement in EMIL despite 2 months of iron supplementation (GI bleed? malabsorption of iron?)\par \par #HTN\par Pt is quite reluctant to take medications, however has been taking amlodipine 10mg qd\par Diastolic BP a bit high (low 90s) on some home readings, 85 when rechecked in office today\par - will hold off on 2nd antihypertensive given pt has been losing weight \par - c/w amlodipine 10mg qd\par \par #Tachycardia\par Has been tachycardic to 110s-120s from the start of her records (2013) w/ no discernible etiology\par TSH at previous visit wnl\par Echo performed - was wnl\par Previous EKG shows sinus tachycardia, rate 108, normal intervals and segments\par May be reactive to anemia\par - cont to monitor\par \par #HLD\par Previous lipid profile with , HDL 60, \par - counseled on importance of continuing w/healthy diet, exercising\par - no need to initiate statin therapy at this time given low ASCVD risk\par \par #HCM\par - PAP negative 3/2018; gyn referral sent\par - mammogram ordered 08/2021\par - pt defers flu shot\par - pt received COVID vaccine 9/24/21 and 10/09/21\par \par #Rash\par patient reports she had a rash on LE that has nearly resolved w/triamcinolone\par - triamcinolone reordered\par \par \par d/w Dr. Salazar\par rtc in 3 months \par Rafat Jones MD\par PGY-1, [employer] [facility]

## 2021-10-25 NOTE — HISTORY OF PRESENT ILLNESS
[FreeTextEntry1] : Follow up visit [de-identified] : Ms. Hernandez is a 42 y/o woman w/HTN, HLD, EMIL, PCOS, and asthma who presents for a routine visit. \par \par Pt reports her BP has been 130s-140s/90s at home. Initially was 132/90 in the office; on repeat was 135/85.\par \par The pt was started on iron supplementation following her previous visit, and notes that her nails, which had previously stopped growing, have had some regrowth. The hair loss she complained of during her last visit has been stable.\par \par She has continued with her recent dietary change. She is on a plant based diet, and has avoided animal products and gluten. \par \par Reports she previously had menorrhagia which she reports has improved with her IUD. She tends to have heavy menses only during the first couple of days of her period. Her periods occur regularly.\par \par She received her COVID vaccine - first dose on 09/24/21, second on 10/09/21.\par \par The patient additionally reported a rash on her lower extremity that has resolved with triamcinolone ointment.\par \par Pt reports she experiences palpitations intermittently and is aware she has a fast heart rate. Otherwise, pt with not acute complaints. She specifically denied fever/chills, CP, SOB, abdominal pain, n/v/d/c, dysuria, hematuria, and hematochezia.

## 2021-11-02 DIAGNOSIS — R00.0 TACHYCARDIA, UNSPECIFIED: ICD-10-CM

## 2021-11-02 DIAGNOSIS — I10 ESSENTIAL (PRIMARY) HYPERTENSION: ICD-10-CM

## 2021-11-02 DIAGNOSIS — D50.9 IRON DEFICIENCY ANEMIA, UNSPECIFIED: ICD-10-CM

## 2021-11-02 DIAGNOSIS — E78.5 HYPERLIPIDEMIA, UNSPECIFIED: ICD-10-CM

## 2021-11-02 DIAGNOSIS — R21 RASH AND OTHER NONSPECIFIC SKIN ERUPTION: ICD-10-CM

## 2021-12-02 ENCOUNTER — APPOINTMENT (OUTPATIENT)
Dept: GASTROENTEROLOGY | Facility: CLINIC | Age: 41
End: 2021-12-02

## 2022-01-11 ENCOUNTER — APPOINTMENT (OUTPATIENT)
Dept: INTERNAL MEDICINE | Facility: CLINIC | Age: 42
End: 2022-01-11
Payer: MEDICAID

## 2022-01-11 ENCOUNTER — OUTPATIENT (OUTPATIENT)
Dept: OUTPATIENT SERVICES | Facility: HOSPITAL | Age: 42
LOS: 1 days | End: 2022-01-11

## 2022-01-11 VITALS
DIASTOLIC BLOOD PRESSURE: 92 MMHG | HEIGHT: 63 IN | SYSTOLIC BLOOD PRESSURE: 138 MMHG | BODY MASS INDEX: 31.01 KG/M2 | WEIGHT: 175 LBS

## 2022-01-11 PROCEDURE — ZZZZZ: CPT

## 2022-01-12 DIAGNOSIS — D64.9 ANEMIA, UNSPECIFIED: ICD-10-CM

## 2022-01-12 DIAGNOSIS — I10 ESSENTIAL (PRIMARY) HYPERTENSION: ICD-10-CM

## 2022-01-12 NOTE — ASSESSMENT
[FreeTextEntry1] : #HCM\par - PAP negative 3/2018; gyn referral sent--patient with upcoming GYN appointment for PAP\par - mammogram appointment upcoming\par - pt defers flu shot at this time\par - pt received COVID vaccine 9/24/21 and 10/09/21, advised to obtain boosters within next 4 months\par \par \par Case D/W Dr. Broussard\par RTC in 3 months for f/u visit and labwork as listed above\par Ricardo Pinzon MD\par

## 2022-01-12 NOTE — HISTORY OF PRESENT ILLNESS
[Home] : at home, [unfilled] , at the time of the visit. [Other Location: e.g. Home (Enter Location, City,State)___] : at [unfilled] [Verbal consent obtained from patient] : the patient, [unfilled] [FreeTextEntry1] : Routine Follow Up [de-identified] : Ms. Hernandez is a 42 y/o woman w/HTN, HLD, EMIL, PCOS, and asthma who presents for a routine telehealth visit. She states that she has been feeling well since her last visit and denies any interval hospitalizations, illnesses, ED visits or urgent care visits. She once again states that she thinks the oral iron pills are helping since her "nails are starting to grow much more than they used to." She denies any recent headaches, fatigue, BERGMAN, chest pain with exertion or at rest, lightheadedness or dizziness. She says that her only symptom from her anemia is an occasional crazing to chew ice chips. \par \par In regards to her HTN, she says that she has been routinely checking her BPs at home and they typically run in the 130s/80s. She denies headaches, chest pain, SOB, or other complaints.\par \par She has had menorrhagia in the past, which she reports has improved with her IUD. She tends to have heavy menses only during the first couple of days of her period. Her periods occur regularly and she denies heavier bleeding than her baseline.\par \par She has upcoming appointments for both her PAP smear and mammograms.\par \par Otherwise, patient is feeling well and denies any other acute complaints or concerns. She is planning to get her COVID booster in the next few months.

## 2022-02-24 ENCOUNTER — NON-APPOINTMENT (OUTPATIENT)
Age: 42
End: 2022-02-24

## 2022-02-24 ENCOUNTER — APPOINTMENT (OUTPATIENT)
Dept: OPHTHALMOLOGY | Facility: CLINIC | Age: 42
End: 2022-02-24
Payer: MEDICAID

## 2022-02-24 PROCEDURE — 92014 COMPRE OPH EXAM EST PT 1/>: CPT

## 2022-04-12 ENCOUNTER — APPOINTMENT (OUTPATIENT)
Dept: INTERNAL MEDICINE | Facility: CLINIC | Age: 42
End: 2022-04-12

## 2022-05-18 ENCOUNTER — APPOINTMENT (OUTPATIENT)
Dept: INTERNAL MEDICINE | Facility: CLINIC | Age: 42
End: 2022-05-18
Payer: MEDICAID

## 2022-05-18 ENCOUNTER — OUTPATIENT (OUTPATIENT)
Dept: OUTPATIENT SERVICES | Facility: HOSPITAL | Age: 42
LOS: 1 days | End: 2022-05-18

## 2022-05-18 VITALS
WEIGHT: 194 LBS | BODY MASS INDEX: 34.38 KG/M2 | HEART RATE: 120 BPM | DIASTOLIC BLOOD PRESSURE: 80 MMHG | OXYGEN SATURATION: 98 % | SYSTOLIC BLOOD PRESSURE: 160 MMHG | TEMPERATURE: 96.1 F | HEIGHT: 63 IN

## 2022-05-18 VITALS — DIASTOLIC BLOOD PRESSURE: 86 MMHG | SYSTOLIC BLOOD PRESSURE: 142 MMHG

## 2022-05-18 DIAGNOSIS — D50.9 IRON DEFICIENCY ANEMIA, UNSPECIFIED: ICD-10-CM

## 2022-05-18 DIAGNOSIS — E78.5 HYPERLIPIDEMIA, UNSPECIFIED: ICD-10-CM

## 2022-05-18 PROCEDURE — 99214 OFFICE O/P EST MOD 30 MIN: CPT

## 2022-05-19 PROBLEM — D50.9 IRON DEFICIENCY ANEMIA: Status: ACTIVE | Noted: 2020-01-14

## 2022-05-27 DIAGNOSIS — I10 ESSENTIAL (PRIMARY) HYPERTENSION: ICD-10-CM

## 2022-05-27 DIAGNOSIS — D64.9 ANEMIA, UNSPECIFIED: ICD-10-CM

## 2022-05-27 DIAGNOSIS — D50.9 IRON DEFICIENCY ANEMIA, UNSPECIFIED: ICD-10-CM

## 2022-05-27 DIAGNOSIS — E78.5 HYPERLIPIDEMIA, UNSPECIFIED: ICD-10-CM

## 2022-05-27 NOTE — HISTORY OF PRESENT ILLNESS
[FreeTextEntry1] : 41F presenting for follow-up of chronic health conditions.  [de-identified] : #Iron deficiency anemia Menorrhagia has improved over past year; less suspicious as source of bleed Hb 10.1 from 10.5 in August 2021 Iron studies remain consistent w/EMIL (low iron, high TIBC, low transferrin sat, low ferritin); serum iron has increased marginally w/supplementation Patient has also recently adopted vegan diet - perhaps nutritional deficiency? - advised pt to continue w/iron supplementation - will f/u Hb electrophoresis to determine ?hemoglobinopathy contributing to anemia - will place GI referral given no improvement in EMIL despite 2 months of iron supplementation (GI bleed? malabsorption of iron?)  #HTN Pt is quite reluctant to take medications, however has been taking amlodipine 10mg qd Diastolic BP a bit high (low 90s) on some home readings, 85 when rechecked in office today - will hold off on 2nd antihypertensive given pt has been losing weight  - c/w amlodipine 10mg qd  #Tachycardia Has been tachycardic to 110s-120s from the start of her records (2013) w/ no discernible etiology TSH at previous visit wnl Echo performed - was wnl Previous EKG shows sinus tachycardia, rate 108, normal intervals and segments May be reactive to anemia - cont to monitor  #HLD Previous lipid profile with , HDL 60,  - counseled on importance of continuing w/healthy diet, exercising - no need to initiate statin therapy at this time given low ASCVD risk  #HCM - PAP negative 3/2018; gyn referral sent - mammogram ordered 08/2021 - pt defers flu shot - pt received COVID vaccine 9/24/21 and 10/09/21  #Rash patient reports she had a rash on LE that has nearly resolved w/triamcinolone - triamcinolone reordered

## 2022-05-27 NOTE — ASSESSMENT
[FreeTextEntry1] : #Iron deficiency anemia\par Menorrhagia has improved over past year; less suspicious as source of bleed\par Hb 10.1 from 10.5 in August 2021\par Iron studies remain consistent w/EMIL (low iron, high TIBC, low transferrin sat, low ferritin);\par Patient has also recently adopted pescetarian diet \par - advised pt to continue w/iron supplementation 325mg TID \par - patient has had Mirena since 2019; reports menorrhagia improved - uses 3 super tampons on first 3 days of period, and much lighter on days 4-6\par - scheduled for GI appt June 2022\par \par #HTN\par Pt is quite reluctant to take medications, however has been taking amlodipine 10mg qd\par SBP on arrival was 160, however patient had just taken a a walk and was 140 on subsequent measurement. \par - c/w amlodipine 10mg qd\par \par #Tachycardia\par Has been tachycardic to 110s-120s from the start of her records (2013) w/ no discernible etiology\par TSH at previous visit wnl\par Echo performed - was wnl\par Previous EKG shows sinus tachycardia, rate 108, normal intervals and segments\par - cont to monitor\par \par #HLD\par Previous lipid profile with , HDL 60, \par - counseled on importance of continuing w/healthy diet, exercising\par - no need to initiate statin therapy at this time given low ASCVD risk\par \par #HCM\par - PAP negative 3/2018; has Gyn appointment June 2022\par - mammogram appointment June 2022 (last done 2018) \par - pt defers flu shot\par - pt received COVID vaccine 9/24/21 and 10/09/21\par \par \par RTC 3 months to check Hgb, iron studies. \par \par Case discussed with Dr. Irizarry. \par

## 2022-06-15 LAB
BASOPHILS # BLD AUTO: 0.08 K/UL
BASOPHILS NFR BLD AUTO: 0.8 %
EOSINOPHIL # BLD AUTO: 0.39 K/UL
EOSINOPHIL NFR BLD AUTO: 3.7 %
FERRITIN SERPL-MCNC: 41 NG/ML
HCT VFR BLD CALC: 41.2 %
HGB BLD-MCNC: 13 G/DL
IMM GRANULOCYTES NFR BLD AUTO: 0.7 %
IRON SERPL-MCNC: 155 UG/DL
LYMPHOCYTES # BLD AUTO: 2.26 K/UL
LYMPHOCYTES NFR BLD AUTO: 21.6 %
MAN DIFF?: NORMAL
MCHC RBC-ENTMCNC: 25.3 PG
MCHC RBC-ENTMCNC: 31.6 GM/DL
MCV RBC AUTO: 80.2 FL
MONOCYTES # BLD AUTO: 0.84 K/UL
MONOCYTES NFR BLD AUTO: 8 %
NEUTROPHILS # BLD AUTO: 6.81 K/UL
NEUTROPHILS NFR BLD AUTO: 65.2 %
PLATELET # BLD AUTO: 390 K/UL
RBC # BLD: 5.14 M/UL
RBC # FLD: 15.9 %
WBC # FLD AUTO: 10.45 K/UL

## 2022-07-21 ENCOUNTER — RESULT REVIEW (OUTPATIENT)
Age: 42
End: 2022-07-21

## 2022-07-21 ENCOUNTER — OUTPATIENT (OUTPATIENT)
Dept: OUTPATIENT SERVICES | Facility: HOSPITAL | Age: 42
LOS: 1 days | End: 2022-07-21

## 2022-07-21 ENCOUNTER — APPOINTMENT (OUTPATIENT)
Dept: OBGYN | Facility: HOSPITAL | Age: 42
End: 2022-07-21

## 2022-07-21 VITALS
SYSTOLIC BLOOD PRESSURE: 154 MMHG | HEART RATE: 109 BPM | BODY MASS INDEX: 34.2 KG/M2 | DIASTOLIC BLOOD PRESSURE: 93 MMHG | HEIGHT: 63 IN | TEMPERATURE: 97.9 F | WEIGHT: 193 LBS

## 2022-07-21 DIAGNOSIS — N60.22 FIBROADENOSIS OF LEFT BREAST: ICD-10-CM

## 2022-07-21 DIAGNOSIS — I10 ESSENTIAL (PRIMARY) HYPERTENSION: ICD-10-CM

## 2022-07-21 DIAGNOSIS — T83.32XA DISPLACEMENT OF INTRAUTERINE CONTRACEPTIVE DEVICE, INITIAL ENCOUNTER: ICD-10-CM

## 2022-07-21 DIAGNOSIS — Z86.2 PERSONAL HISTORY OF DISEASES OF THE BLOOD AND BLOOD-FORMING ORGANS AND CERTAIN DISORDERS INVOLVING THE IMMUNE MECHANISM: ICD-10-CM

## 2022-07-21 DIAGNOSIS — R21 RASH AND OTHER NONSPECIFIC SKIN ERUPTION: ICD-10-CM

## 2022-07-21 DIAGNOSIS — Z00.00 ENCOUNTER FOR GENERAL ADULT MEDICAL EXAMINATION W/OUT ABNORMAL FINDINGS: ICD-10-CM

## 2022-07-21 DIAGNOSIS — Z01.419 ENCOUNTER FOR GYNECOLOGICAL EXAMINATION (GENERAL) (ROUTINE) W/OUT ABNORMAL FINDINGS: ICD-10-CM

## 2022-07-21 DIAGNOSIS — N63.0 UNSPECIFIED LUMP IN UNSPECIFIED BREAST: ICD-10-CM

## 2022-07-21 PROCEDURE — 99213 OFFICE O/P EST LOW 20 MIN: CPT | Mod: GC

## 2022-07-22 DIAGNOSIS — I10 ESSENTIAL (PRIMARY) HYPERTENSION: ICD-10-CM

## 2022-07-22 DIAGNOSIS — Z01.419 ENCOUNTER FOR GYNECOLOGICAL EXAMINATION (GENERAL) (ROUTINE) WITHOUT ABNORMAL FINDINGS: ICD-10-CM

## 2022-07-22 DIAGNOSIS — Z00.00 ENCOUNTER FOR GENERAL ADULT MEDICAL EXAMINATION WITHOUT ABNORMAL FINDINGS: ICD-10-CM

## 2022-07-22 DIAGNOSIS — N60.22 FIBROADENOSIS OF LEFT BREAST: ICD-10-CM

## 2022-07-22 DIAGNOSIS — N63.0 UNSPECIFIED LUMP IN UNSPECIFIED BREAST: ICD-10-CM

## 2022-07-22 PROBLEM — R21 RASH: Status: RESOLVED | Noted: 2021-10-24 | Resolved: 2022-07-22

## 2022-07-22 PROBLEM — T83.32XA INTRAUTERINE CONTRACEPTIVE DEVICE THREADS LOST, INITIAL ENCOUNTER: Status: RESOLVED | Noted: 2019-10-03 | Resolved: 2022-07-22

## 2022-07-22 LAB
C TRACH RRNA SPEC QL NAA+PROBE: SIGNIFICANT CHANGE UP
HBV SURFACE AG SER-ACNC: SIGNIFICANT CHANGE UP
HCV RNA SPEC NAA+PROBE-LOG IU: SIGNIFICANT CHANGE UP
HCV RNA SPEC NAA+PROBE-LOG IU: SIGNIFICANT CHANGE UP LOGIU/ML
HPV HIGH+LOW RISK DNA PNL CVX: SIGNIFICANT CHANGE UP
N GONORRHOEA RRNA SPEC QL NAA+PROBE: SIGNIFICANT CHANGE UP
SPECIMEN SOURCE: SIGNIFICANT CHANGE UP
T PALLIDUM AB TITR SER: NEGATIVE — SIGNIFICANT CHANGE UP

## 2022-07-22 RX ORDER — METFORMIN HYDROCHLORIDE 500 MG/1
500 TABLET, COATED ORAL DAILY
Qty: 30 | Refills: 3 | Status: DISCONTINUED | COMMUNITY
Start: 2020-02-12 | End: 2022-07-22

## 2022-07-22 NOTE — PROCEDURE
[Cervical Pap Smear] : cervical Pap smear [Liquid Base] : liquid base [GC Chlamydia Culture] : GC Chlamydia Culture [Tolerated Well] : the patient tolerated the procedure well

## 2022-07-23 NOTE — HISTORY OF PRESENT ILLNESS
[FreeTextEntry1] : 41 yo G0 LMP 7/13 with PMH PCOS, HTN, Anemia, HLD, Asthma, and sclerosing adenosis of L breast presenting today for annual GYN exam. Patient feels well with no acute complaints. Not currently sexually active. Mirena IUD in place for menorrhagia and patient is happy with this. \par \par Patient reports compliance with amlodipine 10 mg QD for HTN. BPs at home are 130s/80s. Takes albuterol PRN for asthma, but has not required it for > 5 yrs. \par \par Pt lives with sisters and parents and feels safe and supported at home.\par \par Health maintenance:\par Last pap/HPV: 2018 (nml)\par Last mammo: 2019\par \par OBHx: G0\par GYNHx: PCOS. Denies fibroids, cysts, endometriosis. \par

## 2022-07-23 NOTE — PHYSICAL EXAM
[Chaperone Present] : A chaperone was present in the examining room during all aspects of the physical examination [Appropriately responsive] : appropriately responsive [Alert] : alert [No Acute Distress] : no acute distress [Regular Rate Rhythm] : regular rate rhythm [No Murmurs] : no murmurs [Clear to Auscultation B/L] : clear to auscultation bilaterally [Soft] : soft [Non-tender] : non-tender [Oriented x3] : oriented x3 [Examination Of The Breasts] : a normal appearance [Normal] : normal [Uterine Adnexae] : non-palpable [FreeTextEntry6] : 1x1x1 cm fibrocystic mass in R breast @11:30, 8 cm from areola

## 2022-07-23 NOTE — PLAN
[FreeTextEntry1] : 43 yo G0 LMP 7/13 with PMH PCOS, HTN, Anemia, HLD, Asthma, and sclerosing adenosis of L breast presenting today for annual GYN exam with no acute complaints.\par \par #Breast lump\par - Diagnostic mammo + b/l ultrasound\par - Referred to breast clinic\par - F/u GYN clinic in 6 mo\par \par #Maintenance:\par - Breast mammo/sono ordered\par - Pap/HPV today\par - STI screening offered and pt agrees (GC, Hep C, Hep B, RPR. HIV in 2020 neg)\par \par #HTN\par - /93 today. Repeat manual BP was 130/85\par - Continue amlodipine and home monitoring\par - Rest of care per PCP\par \par Seen and d/w Dr. Cyr,\par Kassidy Martin PGY1

## 2022-07-26 LAB — CYTOLOGY SPEC DOC CYTO: SIGNIFICANT CHANGE UP

## 2022-10-10 ENCOUNTER — RX RENEWAL (OUTPATIENT)
Age: 42
End: 2022-10-10

## 2022-10-10 RX ORDER — FERROUS SULFATE TAB EC 324 MG (65 MG FE EQUIVALENT) 324 (65 FE) MG
324 (65 FE) TABLET DELAYED RESPONSE ORAL DAILY
Qty: 15 | Refills: 7 | Status: ACTIVE | COMMUNITY
Start: 2021-08-16 | End: 1900-01-01

## 2023-01-18 ENCOUNTER — OUTPATIENT (OUTPATIENT)
Dept: OUTPATIENT SERVICES | Facility: HOSPITAL | Age: 43
LOS: 1 days | End: 2023-01-18
Payer: MEDICAID

## 2023-01-18 ENCOUNTER — RESULT REVIEW (OUTPATIENT)
Age: 43
End: 2023-01-18

## 2023-01-18 ENCOUNTER — APPOINTMENT (OUTPATIENT)
Dept: MAMMOGRAPHY | Facility: IMAGING CENTER | Age: 43
End: 2023-01-18
Payer: MEDICAID

## 2023-01-18 ENCOUNTER — APPOINTMENT (OUTPATIENT)
Dept: ULTRASOUND IMAGING | Facility: IMAGING CENTER | Age: 43
End: 2023-01-18
Payer: MEDICAID

## 2023-01-18 DIAGNOSIS — Z00.8 ENCOUNTER FOR OTHER GENERAL EXAMINATION: ICD-10-CM

## 2023-01-18 PROCEDURE — 76641 ULTRASOUND BREAST COMPLETE: CPT | Mod: 26,50

## 2023-01-18 PROCEDURE — 77066 DX MAMMO INCL CAD BI: CPT | Mod: 26

## 2023-01-18 PROCEDURE — 77066 DX MAMMO INCL CAD BI: CPT

## 2023-01-18 PROCEDURE — G0279: CPT

## 2023-01-18 PROCEDURE — G0279: CPT | Mod: 26

## 2023-01-18 PROCEDURE — 76641 ULTRASOUND BREAST COMPLETE: CPT

## 2023-01-24 ENCOUNTER — APPOINTMENT (OUTPATIENT)
Dept: OBGYN | Facility: HOSPITAL | Age: 43
End: 2023-01-24

## 2023-01-25 ENCOUNTER — EMERGENCY (EMERGENCY)
Facility: HOSPITAL | Age: 43
LOS: 1 days | Discharge: HOME | End: 2023-01-25
Attending: EMERGENCY MEDICINE | Admitting: EMERGENCY MEDICINE
Payer: MEDICAID

## 2023-01-25 VITALS
HEART RATE: 105 BPM | TEMPERATURE: 99 F | RESPIRATION RATE: 18 BRPM | SYSTOLIC BLOOD PRESSURE: 177 MMHG | OXYGEN SATURATION: 99 % | DIASTOLIC BLOOD PRESSURE: 105 MMHG

## 2023-01-25 PROCEDURE — 99284 EMERGENCY DEPT VISIT MOD MDM: CPT

## 2023-01-25 RX ORDER — IBUPROFEN 200 MG
800 TABLET ORAL ONCE
Refills: 0 | Status: COMPLETED | OUTPATIENT
Start: 2023-01-25 | End: 2023-01-25

## 2023-01-25 RX ADMIN — Medication 800 MILLIGRAM(S): at 14:05

## 2023-01-25 NOTE — ED PROVIDER NOTE - NSICDXPASTMEDICALHX_GEN_ALL_CORE_FT
PAST MEDICAL HISTORY:  Anemia requiring transfusions     Dysfunctional uterine bleeding     HTN (hypertension)     Obesity

## 2023-01-25 NOTE — ED PROVIDER NOTE - PHYSICAL EXAMINATION
ATTENDING PHYSICAL EXAM  GEN - NAD; well appearing; A+O x3, ambulating comfortably.  BP checked with appropriate sized cuff.  Sitting position.  /98, .  /100 .  HEAD - NC/AT; EYES/NOSE - PERRL, EOMI, mucous membranes moist, No gum swelling/evidence of abscess at site of toothache.  NECK: Neck supple, non-tender without lymphadenopathy, no masses, no JVD  PULMONARY - CTA b/l, symmetric breath sounds, no w/r/r  CARDIAC -s1s2, RRR, no M,R,G  ABDOMEN - +NABS, ND, NT, soft, no guarding, no rebound, no masses   BACK - no CVA tenderness, No vertebral or paravertebral tenderness  EXTREMITIES - symmetric pulses, 2+ dp, capillary refill < 2 seconds, no clubbing, no cyanosis, no edema   NEUROLOGIC - alert, CN 2-12 intact, sensation nl, coordination nl, romberg negative, motor 5/5 RUE/LUE/RLE/LLE/EHL/Plantar flexion, no pronator drift, gait nl

## 2023-01-25 NOTE — ED PROVIDER NOTE - NSFOLLOWUPINSTRUCTIONS_ED_ALL_ED_FT
Follow up with your PMD within 48-72 hrs. Show copies of your reports given to you.   Return to ER for any chest pain, lightheadedness, headache, or further concern.

## 2023-01-25 NOTE — ED PROVIDER NOTE - PATIENT PORTAL LINK FT
You can access the FollowMyHealth Patient Portal offered by [facility] by registering at the following website: [email or web address]. By joining [facility]’s FollowMyHealth portal, you will also be able to view your health information using other applications (apps) compatible with our system.

## 2023-01-25 NOTE — ED PROVIDER NOTE - CLINICAL SUMMARY MEDICAL DECISION MAKING FREE TEXT BOX
42-year-old female with elevated blood pressure.  Patient had diagnosis of hypertension in the past, although medication treatment stopped after found to have normal blood pressures.  Current elevated blood pressure may be due to underlying diagnosis of primary hypertension, although elevated blood pressure may just be due to pain from toothache.   EKG done at triage reviewed.  Elevated blood pressure unlikely due to acute coronary syndrome, hypertensive emergency, or aortic dissection.  Recommend treatment with pain medication.  Discussed with patient need to follow-up with her PMD for repeat evaluation.  If blood pressure remains elevated will need further work-up for other secondary causes or consideration of recurrent primary hypertension.  Stable for discharge from ED.

## 2023-01-25 NOTE — ED ADULT TRIAGE NOTE - CHIEF COMPLAINT QUOTE
Pt brought in from an [facility] dental clinic for hypertension. Pt was going to have a tooth extraction and was found to be hypertensive. Pt denies PMH of HTN, denies any symptoms aside from dental pain. Denies HA, denies dizziness, denies blurred vision

## 2023-01-25 NOTE — ED PROVIDER NOTE - OBJECTIVE STATEMENT
42-year-old female with mother at bedside.  Patient having right upper tooth ache x2 weeks and cared for by dental team.  To dental clinic this morning for planned tooth extraction.  Had previously been taking ibuprofen for pain, but did not take today.  Found to have elevated blood pressure at dental clinic and referred to ED for evaluation.  Procedure not performed.  Patient reports having diagnosis of hypertension few years ago and was taking amlodipine at that time.  Over a year ago was found to have normal blood pressures and medications stopped by PMD.  Besides to take, patient has been feeling well.  Denies any headache, blurry vision, chest pain or shortness of breath.  No other complaints.  Previous [facility] records reviewed.  Not taking any medications currently.  Last menstrual period was January 5, 2023.

## 2023-07-03 NOTE — PHYSICAL EXAM
[No Acute Distress] : no acute distress [Well Nourished] : well nourished [Well Developed] : well developed [Normal Sclera/Conjunctiva] : normal sclera/conjunctiva [PERRL] : pupils equal round and reactive to light [EOMI] : extraocular movements intact [Normal Outer Ear/Nose] : the outer ears and nose were normal in appearance [Normal Oropharynx] : the oropharynx was normal [No JVD] : no jugular venous distention [Supple] : supple [No Respiratory Distress] : no respiratory distress  [No Accessory Muscle Use] : no accessory muscle use [Clear to Auscultation] : lungs were clear to auscultation bilaterally [Normal Rate] : normal rate  [Regular Rhythm] : with a regular rhythm [Normal S1, S2] : normal S1 and S2 [No Murmur] : no murmur heard [No Edema] : there was no peripheral edema [No Extremity Clubbing/Cyanosis] : no extremity clubbing/cyanosis [Soft] : abdomen soft [Non Tender] : non-tender [Non-distended] : non-distended [Normal Bowel Sounds] : normal bowel sounds [No Joint Swelling] : no joint swelling [No Rash] : no rash [Coordination Grossly Intact] : coordination grossly intact [No Focal Deficits] : no focal deficits 4 = No assist / stand by assistance [Normal Insight/Judgement] : insight and judgment were intact

## 2025-01-30 ENCOUNTER — OUTPATIENT (OUTPATIENT)
Dept: OUTPATIENT SERVICES | Facility: HOSPITAL | Age: 45
LOS: 1 days | End: 2025-01-30

## 2025-01-30 ENCOUNTER — APPOINTMENT (OUTPATIENT)
Dept: OBGYN | Facility: HOSPITAL | Age: 45
End: 2025-01-30
Payer: MEDICAID

## 2025-01-30 VITALS
WEIGHT: 200 LBS | DIASTOLIC BLOOD PRESSURE: 99 MMHG | HEART RATE: 122 BPM | TEMPERATURE: 97.2 F | HEIGHT: 63 IN | BODY MASS INDEX: 35.44 KG/M2 | SYSTOLIC BLOOD PRESSURE: 184 MMHG

## 2025-01-30 DIAGNOSIS — E28.2 POLYCYSTIC OVARIAN SYNDROME: ICD-10-CM

## 2025-01-30 DIAGNOSIS — Z00.00 ENCOUNTER FOR GENERAL ADULT MEDICAL EXAMINATION W/OUT ABNORMAL FINDINGS: ICD-10-CM

## 2025-01-30 PROCEDURE — 99396 PREV VISIT EST AGE 40-64: CPT | Mod: GC

## 2025-01-31 DIAGNOSIS — Z00.00 ENCOUNTER FOR GENERAL ADULT MEDICAL EXAMINATION WITHOUT ABNORMAL FINDINGS: ICD-10-CM

## 2025-01-31 DIAGNOSIS — Z01.419 ENCOUNTER FOR GYNECOLOGICAL EXAMINATION (GENERAL) (ROUTINE) WITHOUT ABNORMAL FINDINGS: ICD-10-CM
